# Patient Record
Sex: FEMALE | Race: WHITE | NOT HISPANIC OR LATINO | Employment: PART TIME | URBAN - METROPOLITAN AREA
[De-identification: names, ages, dates, MRNs, and addresses within clinical notes are randomized per-mention and may not be internally consistent; named-entity substitution may affect disease eponyms.]

---

## 2020-08-05 ENCOUNTER — HOSPITAL ENCOUNTER (EMERGENCY)
Age: 20
Discharge: HOME | End: 2020-08-06
Attending: EMERGENCY MEDICINE

## 2020-08-05 VITALS
TEMPERATURE: 97.2 F | BODY MASS INDEX: 23.77 KG/M2 | SYSTOLIC BLOOD PRESSURE: 124 MMHG | DIASTOLIC BLOOD PRESSURE: 76 MMHG | HEIGHT: 67 IN | WEIGHT: 151.46 LBS | OXYGEN SATURATION: 100 % | RESPIRATION RATE: 16 BRPM | HEART RATE: 76 BPM

## 2020-08-05 DIAGNOSIS — S61.210A LACERATION OF RIGHT INDEX FINGER WITHOUT FOREIGN BODY WITHOUT DAMAGE TO NAIL, INITIAL ENCOUNTER: Primary | ICD-10-CM

## 2020-08-05 DIAGNOSIS — Z23 NEED FOR TETANUS BOOSTER: ICD-10-CM

## 2020-08-05 PROCEDURE — 10002800 HB RX 250 W HCPCS: Performed by: EMERGENCY MEDICINE

## 2020-08-05 PROCEDURE — 12001 RPR S/N/AX/GEN/TRNK 2.5CM/<: CPT | Performed by: EMERGENCY MEDICINE

## 2020-08-05 PROCEDURE — 99282 EMERGENCY DEPT VISIT SF MDM: CPT

## 2020-08-05 PROCEDURE — 90471 IMMUNIZATION ADMIN: CPT | Performed by: EMERGENCY MEDICINE

## 2020-08-05 PROCEDURE — 90715 TDAP VACCINE 7 YRS/> IM: CPT | Performed by: EMERGENCY MEDICINE

## 2020-08-05 RX ADMIN — TETANUS TOXOID, REDUCED DIPHTHERIA TOXOID AND ACELLULAR PERTUSSIS VACCINE, ADSORBED 0.5 ML: 5; 2.5; 8; 8; 2.5 SUSPENSION INTRAMUSCULAR at 23:40

## 2020-08-05 ASSESSMENT — ENCOUNTER SYMPTOMS
CONSTITUTIONAL NEGATIVE: 1
RESPIRATORY NEGATIVE: 1
HEMATOLOGIC/LYMPHATIC NEGATIVE: 1
NEUROLOGICAL NEGATIVE: 1
ALLERGIC/IMMUNOLOGIC NEGATIVE: 1

## 2020-08-13 ENCOUNTER — IMAGING SERVICES (OUTPATIENT)
Dept: GENERAL RADIOLOGY | Age: 20
End: 2020-08-13
Attending: NURSE PRACTITIONER

## 2020-08-13 DIAGNOSIS — M79.5 FOREIGN BODY (FB) IN SOFT TISSUE: ICD-10-CM

## 2020-09-28 ENCOUNTER — HOSPITAL ENCOUNTER (EMERGENCY)
Age: 20
Discharge: HOME | End: 2020-09-28
Attending: EMERGENCY MEDICINE

## 2020-09-28 VITALS
HEART RATE: 88 BPM | WEIGHT: 135 LBS | DIASTOLIC BLOOD PRESSURE: 64 MMHG | HEIGHT: 65 IN | TEMPERATURE: 97.9 F | SYSTOLIC BLOOD PRESSURE: 130 MMHG | OXYGEN SATURATION: 98 % | RESPIRATION RATE: 15 BRPM | BODY MASS INDEX: 22.49 KG/M2

## 2020-09-28 DIAGNOSIS — H60.503 ACUTE OTITIS EXTERNA OF BOTH EARS, UNSPECIFIED TYPE: Primary | ICD-10-CM

## 2020-09-28 DIAGNOSIS — S09.90XA CLOSED HEAD INJURY WITHOUT LOSS OF CONSCIOUSNESS, INITIAL ENCOUNTER: ICD-10-CM

## 2020-09-28 DIAGNOSIS — S80.01XA CONTUSION OF RIGHT KNEE, INITIAL ENCOUNTER: ICD-10-CM

## 2020-09-28 PROCEDURE — 99283 EMERGENCY DEPT VISIT LOW MDM: CPT

## 2020-09-28 PROCEDURE — 10002803 HB RX 637: Performed by: PHYSICIAN ASSISTANT

## 2020-09-28 RX ORDER — OFLOXACIN 3 MG/ML
5 SOLUTION AURICULAR (OTIC) 2 TIMES DAILY
Qty: 3.5 ML | Refills: 0 | Status: SHIPPED | OUTPATIENT
Start: 2020-09-28 | End: 2020-10-05

## 2020-09-28 RX ORDER — IBUPROFEN 600 MG/1
600 TABLET ORAL ONCE
Status: COMPLETED | OUTPATIENT
Start: 2020-09-28 | End: 2020-09-28

## 2020-09-28 RX ADMIN — IBUPROFEN 600 MG: 600 TABLET ORAL at 18:43

## 2020-09-28 ASSESSMENT — ENCOUNTER SYMPTOMS
NAUSEA: 0
HEADACHES: 1
NUMBNESS: 0
CHILLS: 0
SHORTNESS OF BREATH: 0
FEVER: 0
DIZZINESS: 0
VOMITING: 0
COUGH: 0

## 2020-09-28 ASSESSMENT — PAIN SCALES - GENERAL: PAINLEVEL_OUTOF10: 4

## 2020-10-21 ENCOUNTER — TELEPHONE (OUTPATIENT)
Dept: SCHEDULING | Age: 20
End: 2020-10-21

## 2020-10-27 ENCOUNTER — OFFICE VISIT (OUTPATIENT)
Dept: INTERNAL MEDICINE | Age: 20
End: 2020-10-27

## 2020-10-27 VITALS
HEART RATE: 75 BPM | TEMPERATURE: 97.6 F | OXYGEN SATURATION: 100 % | WEIGHT: 153.2 LBS | SYSTOLIC BLOOD PRESSURE: 121 MMHG | DIASTOLIC BLOOD PRESSURE: 75 MMHG

## 2020-10-27 DIAGNOSIS — S09.90XA CLOSED HEAD INJURY WITHOUT LOSS OF CONSCIOUSNESS, INITIAL ENCOUNTER: Primary | ICD-10-CM

## 2020-10-27 DIAGNOSIS — M25.561 ACUTE PAIN OF RIGHT KNEE: ICD-10-CM

## 2020-10-27 PROCEDURE — 99204 OFFICE O/P NEW MOD 45 MIN: CPT | Performed by: INTERNAL MEDICINE

## 2020-10-27 RX ORDER — DICLOFENAC SODIUM 75 MG/1
TABLET, DELAYED RELEASE ORAL
COMMUNITY
Start: 2020-10-07 | End: 2020-10-27 | Stop reason: SDUPTHER

## 2020-10-27 RX ORDER — DICLOFENAC SODIUM 75 MG/1
75 TABLET, DELAYED RELEASE ORAL 2 TIMES DAILY
Qty: 60 TABLET | Refills: 1 | Status: SHIPPED | OUTPATIENT
Start: 2020-10-27 | End: 2023-02-03 | Stop reason: CLARIF

## 2020-10-27 ASSESSMENT — ENCOUNTER SYMPTOMS
HEADACHES: 1
DIZZINESS: 0

## 2021-10-16 ENCOUNTER — HOSPITAL ENCOUNTER (EMERGENCY)
Age: 21
Discharge: HOME | End: 2021-10-16
Attending: EMERGENCY MEDICINE

## 2021-10-16 VITALS
OXYGEN SATURATION: 100 % | TEMPERATURE: 98.1 F | SYSTOLIC BLOOD PRESSURE: 133 MMHG | HEART RATE: 77 BPM | HEIGHT: 66 IN | DIASTOLIC BLOOD PRESSURE: 78 MMHG | RESPIRATION RATE: 16 BRPM

## 2021-10-16 DIAGNOSIS — T23.222A PARTIAL THICKNESS BURN OF SINGLE FINGER OF LEFT HAND EXCLUDING THUMB, INITIAL ENCOUNTER: Primary | ICD-10-CM

## 2021-10-16 PROCEDURE — 16000 INITIAL TREATMENT OF BURN(S): CPT

## 2021-10-16 PROCEDURE — 16020 DRESS/DEBRID P-THICK BURN S: CPT

## 2021-10-16 PROCEDURE — 99283 EMERGENCY DEPT VISIT LOW MDM: CPT

## 2021-10-16 PROCEDURE — 10002803 HB RX 637: Performed by: EMERGENCY MEDICINE

## 2021-10-16 RX ORDER — IBUPROFEN 600 MG/1
600 TABLET ORAL ONCE
Status: COMPLETED | OUTPATIENT
Start: 2021-10-16 | End: 2021-10-16

## 2021-10-16 RX ADMIN — IBUPROFEN 600 MG: 600 TABLET ORAL at 21:33

## 2021-10-16 ASSESSMENT — PAIN SCALES - GENERAL: PAINLEVEL_OUTOF10: 4

## 2021-10-16 ASSESSMENT — ENCOUNTER SYMPTOMS
GASTROINTESTINAL NEGATIVE: 1
NEUROLOGICAL NEGATIVE: 1
RESPIRATORY NEGATIVE: 1
CHILLS: 0
FEVER: 0

## 2022-08-04 ENCOUNTER — APPOINTMENT (OUTPATIENT)
Dept: GENERAL RADIOLOGY | Age: 22
End: 2022-08-04
Attending: PHYSICIAN ASSISTANT

## 2022-08-04 ENCOUNTER — HOSPITAL ENCOUNTER (EMERGENCY)
Age: 22
Discharge: HOME | End: 2022-08-04
Attending: EMERGENCY MEDICINE

## 2022-08-04 ENCOUNTER — APPOINTMENT (OUTPATIENT)
Dept: CT IMAGING | Age: 22
End: 2022-08-04
Attending: PHYSICIAN ASSISTANT

## 2022-08-04 VITALS
RESPIRATION RATE: 16 BRPM | HEIGHT: 66 IN | HEART RATE: 78 BPM | TEMPERATURE: 97.7 F | DIASTOLIC BLOOD PRESSURE: 80 MMHG | SYSTOLIC BLOOD PRESSURE: 120 MMHG | OXYGEN SATURATION: 99 %

## 2022-08-04 DIAGNOSIS — R10.30 LOWER ABDOMINAL PAIN: Primary | ICD-10-CM

## 2022-08-04 LAB
ALBUMIN SERPL-MCNC: 3.8 G/DL (ref 3.6–5.1)
ALP SERPL-CCNC: 61 UNITS/L (ref 45–117)
ALT SERPL-CCNC: 21 UNITS/L
ANION GAP SERPL CALC-SCNC: 6 MMOL/L (ref 7–19)
APPEARANCE UR: CLEAR
AST SERPL-CCNC: 18 UNITS/L
BASOPHILS # BLD: 0 K/MCL (ref 0–0.3)
BASOPHILS NFR BLD: 0 %
BILIRUB CONJ SERPL-MCNC: 0.1 MG/DL (ref 0–0.2)
BILIRUB SERPL-MCNC: 0.5 MG/DL (ref 0.2–1)
BILIRUB UR QL STRIP: NEGATIVE
BUN SERPL-MCNC: 5 MG/DL (ref 6–20)
BUN/CREAT SERPL: 8 (ref 7–25)
CALCIUM SERPL-MCNC: 9.1 MG/DL (ref 8.4–10.2)
CHLORIDE SERPL-SCNC: 107 MMOL/L (ref 97–110)
CLUE CELLS VAG QL WET PREP: NORMAL
CO2 SERPL-SCNC: 30 MMOL/L (ref 21–32)
COLOR UR: YELLOW
CREAT SERPL-MCNC: 0.66 MG/DL (ref 0.51–0.95)
DEPRECATED RDW RBC: 48.8 FL (ref 39–50)
EOSINOPHIL # BLD: 0 K/MCL (ref 0–0.5)
EOSINOPHIL NFR BLD: 1 %
ERYTHROCYTE [DISTWIDTH] IN BLOOD: 14.4 % (ref 11–15)
FASTING DURATION TIME PATIENT: ABNORMAL H
GFR SERPLBLD BASED ON 1.73 SQ M-ARVRAT: >90 ML/MIN
GLUCOSE SERPL-MCNC: 94 MG/DL (ref 70–99)
GLUCOSE UR STRIP-MCNC: NEGATIVE MG/DL
HCG SERPL QL: NEGATIVE
HCG UR QL: NEGATIVE
HCT VFR BLD CALC: 39.5 % (ref 36–46.5)
HGB BLD-MCNC: 12.4 G/DL (ref 12–15.5)
HGB UR QL STRIP: NEGATIVE
IMM GRANULOCYTES # BLD AUTO: 0 K/MCL (ref 0–0.2)
IMM GRANULOCYTES # BLD: 0 %
KETONES UR STRIP-MCNC: NEGATIVE MG/DL
LEUKOCYTE ESTERASE UR QL STRIP: NEGATIVE
LIPASE SERPL-CCNC: 137 UNITS/L (ref 73–393)
LYMPHOCYTES # BLD: 2.4 K/MCL (ref 1–4.8)
LYMPHOCYTES NFR BLD: 40 %
MCH RBC QN AUTO: 28.6 PG (ref 26–34)
MCHC RBC AUTO-ENTMCNC: 31.4 G/DL (ref 32–36.5)
MCV RBC AUTO: 91.2 FL (ref 78–100)
MONOCYTES # BLD: 0.6 K/MCL (ref 0.3–0.9)
MONOCYTES NFR BLD: 9 %
NEUTROPHILS # BLD: 3 K/MCL (ref 1.8–7.7)
NEUTROPHILS NFR BLD: 50 %
NITRITE UR QL STRIP: NEGATIVE
NRBC BLD MANUAL-RTO: 0 /100 WBC
PH UR STRIP: 8 [PH] (ref 5–7)
PLATELET # BLD AUTO: 378 K/MCL (ref 140–450)
POTASSIUM SERPL-SCNC: 4 MMOL/L (ref 3.4–5.1)
PROT SERPL-MCNC: 7.8 G/DL (ref 6.4–8.2)
PROT UR STRIP-MCNC: NEGATIVE MG/DL
RAINBOW EXTRA TUBES HOLD SPECIMEN: NORMAL
RBC # BLD: 4.33 MIL/MCL (ref 4–5.2)
SODIUM SERPL-SCNC: 139 MMOL/L (ref 135–145)
SP GR UR STRIP: 1.01 (ref 1–1.03)
T VAGINALIS VAG QL WET PREP: NORMAL
UROBILINOGEN UR STRIP-MCNC: 0.2 MG/DL
WBC # BLD: 5.9 K/MCL (ref 4.2–11)
YEAST VAG QL WET PREP: NORMAL

## 2022-08-04 PROCEDURE — 80048 BASIC METABOLIC PNL TOTAL CA: CPT | Performed by: EMERGENCY MEDICINE

## 2022-08-04 PROCEDURE — 10002800 HB RX 250 W HCPCS: Performed by: PHYSICIAN ASSISTANT

## 2022-08-04 PROCEDURE — 71046 X-RAY EXAM CHEST 2 VIEWS: CPT

## 2022-08-04 PROCEDURE — 96375 TX/PRO/DX INJ NEW DRUG ADDON: CPT

## 2022-08-04 PROCEDURE — 96374 THER/PROPH/DIAG INJ IV PUSH: CPT

## 2022-08-04 PROCEDURE — 10002807 HB RX 258: Performed by: PHYSICIAN ASSISTANT

## 2022-08-04 PROCEDURE — G1004 CDSM NDSC: HCPCS

## 2022-08-04 PROCEDURE — 84703 CHORIONIC GONADOTROPIN ASSAY: CPT

## 2022-08-04 PROCEDURE — 81003 URINALYSIS AUTO W/O SCOPE: CPT | Performed by: PHYSICIAN ASSISTANT

## 2022-08-04 PROCEDURE — 10002805 HB CONTRAST AGENT: Performed by: PHYSICIAN ASSISTANT

## 2022-08-04 PROCEDURE — 87210 SMEAR WET MOUNT SALINE/INK: CPT | Performed by: PHYSICIAN ASSISTANT

## 2022-08-04 PROCEDURE — 74177 CT ABD & PELVIS W/CONTRAST: CPT

## 2022-08-04 PROCEDURE — 87591 N.GONORRHOEAE DNA AMP PROB: CPT | Performed by: PHYSICIAN ASSISTANT

## 2022-08-04 PROCEDURE — 80076 HEPATIC FUNCTION PANEL: CPT | Performed by: PHYSICIAN ASSISTANT

## 2022-08-04 PROCEDURE — 99284 EMERGENCY DEPT VISIT MOD MDM: CPT

## 2022-08-04 PROCEDURE — 13003398 CT LUMBAR SPINE 2D REFORMATTED

## 2022-08-04 PROCEDURE — 85025 COMPLETE CBC W/AUTO DIFF WBC: CPT | Performed by: EMERGENCY MEDICINE

## 2022-08-04 PROCEDURE — 96361 HYDRATE IV INFUSION ADD-ON: CPT

## 2022-08-04 PROCEDURE — 83690 ASSAY OF LIPASE: CPT | Performed by: PHYSICIAN ASSISTANT

## 2022-08-04 PROCEDURE — 84703 CHORIONIC GONADOTROPIN ASSAY: CPT | Performed by: PHYSICIAN ASSISTANT

## 2022-08-04 RX ORDER — ONDANSETRON 2 MG/ML
4 INJECTION INTRAMUSCULAR; INTRAVENOUS ONCE
Status: COMPLETED | OUTPATIENT
Start: 2022-08-04 | End: 2022-08-04

## 2022-08-04 RX ADMIN — ONDANSETRON 4 MG: 2 INJECTION INTRAMUSCULAR; INTRAVENOUS at 15:14

## 2022-08-04 RX ADMIN — IOHEXOL 65 ML: 350 INJECTION, SOLUTION INTRAVENOUS at 16:00

## 2022-08-04 RX ADMIN — KETOROLAC TROMETHAMINE 15 MG: 15 INJECTION, SOLUTION INTRAMUSCULAR; INTRAVENOUS at 15:14

## 2022-08-04 RX ADMIN — SODIUM CHLORIDE 1000 ML: 9 INJECTION, SOLUTION INTRAVENOUS at 15:16

## 2022-08-04 ASSESSMENT — PAIN SCALES - GENERAL: PAINLEVEL_OUTOF10: 6

## 2022-08-05 LAB
C TRACH RRNA CVX QL NAA+PROBE: NEGATIVE
Lab: NORMAL
N GONORRHOEA RRNA CVX QL NAA+PROBE: NEGATIVE

## 2023-02-03 ENCOUNTER — OFFICE VISIT (OUTPATIENT)
Dept: OBGYN | Age: 23
End: 2023-02-03

## 2023-02-03 VITALS
TEMPERATURE: 97.9 F | DIASTOLIC BLOOD PRESSURE: 76 MMHG | SYSTOLIC BLOOD PRESSURE: 119 MMHG | WEIGHT: 165 LBS | HEIGHT: 66 IN | BODY MASS INDEX: 26.52 KG/M2 | HEART RATE: 52 BPM | OXYGEN SATURATION: 100 %

## 2023-02-03 DIAGNOSIS — N92.6 IRREGULAR MENSTRUAL CYCLE: Primary | ICD-10-CM

## 2023-02-03 DIAGNOSIS — N94.6 DYSMENORRHEA: ICD-10-CM

## 2023-02-03 DIAGNOSIS — N75.0 CYST OF BARTHOLIN'S GLAND DUCT: ICD-10-CM

## 2023-02-03 DIAGNOSIS — R10.2 PELVIC PAIN IN FEMALE: ICD-10-CM

## 2023-02-03 DIAGNOSIS — N93.0 BLEEDING AFTER INTERCOURSE: ICD-10-CM

## 2023-02-03 DIAGNOSIS — Z11.3 SCREEN FOR STD (SEXUALLY TRANSMITTED DISEASE): ICD-10-CM

## 2023-02-03 DIAGNOSIS — Z97.5 IUD CONTRACEPTION: ICD-10-CM

## 2023-02-03 LAB
B-HCG UR QL: NEGATIVE
INTERNAL PROCEDURAL CONTROLS ACCEPTABLE: YES
TEST LOT EXPIRATION DATE: NORMAL
TEST LOT NUMBER: NORMAL

## 2023-02-03 PROCEDURE — 99204 OFFICE O/P NEW MOD 45 MIN: CPT | Performed by: OBSTETRICS & GYNECOLOGY

## 2023-02-03 PROCEDURE — 87661 TRICHOMONAS VAGINALIS AMPLIF: CPT | Performed by: INTERNAL MEDICINE

## 2023-02-03 PROCEDURE — 87591 N.GONORRHOEAE DNA AMP PROB: CPT | Performed by: INTERNAL MEDICINE

## 2023-02-03 PROCEDURE — 87491 CHLMYD TRACH DNA AMP PROBE: CPT | Performed by: INTERNAL MEDICINE

## 2023-02-03 PROCEDURE — 81025 URINE PREGNANCY TEST: CPT | Performed by: OBSTETRICS & GYNECOLOGY

## 2023-02-03 PROCEDURE — 88175 CYTOPATH C/V AUTO FLUID REDO: CPT | Performed by: INTERNAL MEDICINE

## 2023-02-03 ASSESSMENT — PATIENT HEALTH QUESTIONNAIRE - PHQ9
CLINICAL INTERPRETATION OF PHQ2 SCORE: NO FURTHER SCREENING NEEDED
SUM OF ALL RESPONSES TO PHQ9 QUESTIONS 1 AND 2: 0
SUM OF ALL RESPONSES TO PHQ9 QUESTIONS 1 AND 2: 0
1. LITTLE INTEREST OR PLEASURE IN DOING THINGS: NOT AT ALL
2. FEELING DOWN, DEPRESSED OR HOPELESS: NOT AT ALL

## 2023-02-06 LAB
C TRACH RRNA CVX QL NAA+PROBE: NEGATIVE
Lab: NORMAL
N GONORRHOEA RRNA CVX QL NAA+PROBE: NEGATIVE
T VAGINALIS RRNA CVX QL NAA+PROBE: NEGATIVE

## 2023-02-07 LAB — HOLD SPECIMEN: NORMAL

## 2023-02-08 LAB
CASE RPRT: NORMAL
CLINICAL INFO: NORMAL
CYTOLOGY CVX/VAG STUDY: NORMAL
PAP EDUCATIONAL NOTE: NORMAL
SPECIMEN ADEQUACY: NORMAL

## 2023-02-23 ENCOUNTER — HOSPITAL ENCOUNTER (OUTPATIENT)
Dept: ULTRASOUND IMAGING | Age: 23
Discharge: HOME | End: 2023-02-23
Attending: OBSTETRICS & GYNECOLOGY

## 2023-02-23 DIAGNOSIS — R10.2 PELVIC PAIN IN FEMALE: ICD-10-CM

## 2023-02-23 PROCEDURE — 76830 TRANSVAGINAL US NON-OB: CPT

## 2023-03-06 ENCOUNTER — HOSPITAL ENCOUNTER (EMERGENCY)
Facility: HOSPITAL | Age: 23
Discharge: HOME | End: 2023-03-06

## 2023-03-06 ENCOUNTER — APPOINTMENT (OUTPATIENT)
Dept: CT IMAGING | Facility: HOSPITAL | Age: 23
End: 2023-03-06
Attending: NURSE PRACTITIONER

## 2023-03-06 VITALS
OXYGEN SATURATION: 100 % | RESPIRATION RATE: 18 BRPM | SYSTOLIC BLOOD PRESSURE: 116 MMHG | WEIGHT: 165 LBS | DIASTOLIC BLOOD PRESSURE: 73 MMHG | TEMPERATURE: 98 F | HEART RATE: 78 BPM | HEIGHT: 66 IN | BODY MASS INDEX: 26.52 KG/M2

## 2023-03-06 DIAGNOSIS — R10.9 ABDOMINAL PAIN, ACUTE: Primary | ICD-10-CM

## 2023-03-06 LAB
ALBUMIN SERPL-MCNC: 4.2 G/DL (ref 3.4–5)
ALBUMIN/GLOB SERPL: 1.1 {RATIO} (ref 1–2)
ALP LIVER SERPL-CCNC: 58 U/L
ALT SERPL-CCNC: 17 U/L
ANION GAP SERPL CALC-SCNC: 4 MMOL/L (ref 0–18)
AST SERPL-CCNC: 22 U/L (ref 15–37)
B-HCG UR QL: NEGATIVE
BASOPHILS # BLD AUTO: 0.01 X10(3) UL (ref 0–0.2)
BASOPHILS NFR BLD AUTO: 0.1 %
BILIRUB SERPL-MCNC: 1.2 MG/DL (ref 0.1–2)
BILIRUB UR QL: NEGATIVE
BUN BLD-MCNC: 7 MG/DL (ref 7–18)
BUN/CREAT SERPL: 7.8 (ref 10–20)
CALCIUM BLD-MCNC: 9.1 MG/DL (ref 8.5–10.1)
CHLORIDE SERPL-SCNC: 107 MMOL/L (ref 98–112)
CLARITY UR: CLEAR
CO2 SERPL-SCNC: 26 MMOL/L (ref 21–32)
COLOR UR: YELLOW
CREAT BLD-MCNC: 0.9 MG/DL
DEPRECATED RDW RBC AUTO: 42.6 FL (ref 35.1–46.3)
EOSINOPHIL # BLD AUTO: 0.01 X10(3) UL (ref 0–0.7)
EOSINOPHIL NFR BLD AUTO: 0.1 %
ERYTHROCYTE [DISTWIDTH] IN BLOOD BY AUTOMATED COUNT: 13.1 % (ref 11–15)
GFR SERPLBLD BASED ON 1.73 SQ M-ARVRAT: 93 ML/MIN/1.73M2 (ref 60–?)
GLOBULIN PLAS-MCNC: 3.7 G/DL (ref 2.8–4.4)
GLUCOSE BLD-MCNC: 90 MG/DL (ref 70–99)
GLUCOSE UR-MCNC: NEGATIVE MG/DL
HCT VFR BLD AUTO: 38.2 %
HGB BLD-MCNC: 12.9 G/DL
HGB UR QL STRIP.AUTO: NEGATIVE
IMM GRANULOCYTES # BLD AUTO: 0.01 X10(3) UL (ref 0–1)
IMM GRANULOCYTES NFR BLD: 0.1 %
LEUKOCYTE ESTERASE UR QL STRIP.AUTO: NEGATIVE
LIPASE SERPL-CCNC: 102 U/L (ref 73–393)
LIPASE SERPL-CCNC: 25 U/L (ref 13–75)
LYMPHOCYTES # BLD AUTO: 1.97 X10(3) UL (ref 1–4)
LYMPHOCYTES NFR BLD AUTO: 23.6 %
MCH RBC QN AUTO: 30 PG (ref 26–34)
MCHC RBC AUTO-ENTMCNC: 33.8 G/DL (ref 31–37)
MCV RBC AUTO: 88.8 FL
MONOCYTES # BLD AUTO: 0.65 X10(3) UL (ref 0.1–1)
MONOCYTES NFR BLD AUTO: 7.8 %
NEUTROPHILS # BLD AUTO: 5.7 X10 (3) UL (ref 1.5–7.7)
NEUTROPHILS # BLD AUTO: 5.7 X10(3) UL (ref 1.5–7.7)
NEUTROPHILS NFR BLD AUTO: 68.3 %
NITRITE UR QL STRIP.AUTO: NEGATIVE
OSMOLALITY SERPL CALC.SUM OF ELEC: 282 MOSM/KG (ref 275–295)
PH UR: 6 [PH] (ref 5–8)
PLATELET # BLD AUTO: 378 10(3)UL (ref 150–450)
POTASSIUM SERPL-SCNC: 3.8 MMOL/L (ref 3.5–5.1)
PROT SERPL-MCNC: 7.9 G/DL (ref 6.4–8.2)
PROT UR-MCNC: NEGATIVE MG/DL
RBC # BLD AUTO: 4.3 X10(6)UL
SODIUM SERPL-SCNC: 137 MMOL/L (ref 136–145)
SP GR UR STRIP: 1.02 (ref 1–1.03)
UROBILINOGEN UR STRIP-ACNC: <2
VIT C UR-MCNC: NEGATIVE MG/DL
WBC # BLD AUTO: 8.4 X10(3) UL (ref 4–11)

## 2023-03-06 PROCEDURE — 96360 HYDRATION IV INFUSION INIT: CPT

## 2023-03-06 PROCEDURE — 80053 COMPREHEN METABOLIC PANEL: CPT

## 2023-03-06 PROCEDURE — 83690 ASSAY OF LIPASE: CPT | Performed by: NURSE PRACTITIONER

## 2023-03-06 PROCEDURE — 99284 EMERGENCY DEPT VISIT MOD MDM: CPT

## 2023-03-06 PROCEDURE — 81003 URINALYSIS AUTO W/O SCOPE: CPT

## 2023-03-06 PROCEDURE — 74177 CT ABD & PELVIS W/CONTRAST: CPT | Performed by: NURSE PRACTITIONER

## 2023-03-06 PROCEDURE — 81025 URINE PREGNANCY TEST: CPT

## 2023-03-06 PROCEDURE — 85025 COMPLETE CBC W/AUTO DIFF WBC: CPT

## 2023-03-06 RX ORDER — DICYCLOMINE HCL 20 MG
20 TABLET ORAL 4 TIMES DAILY PRN
Qty: 30 TABLET | Refills: 0 | Status: SHIPPED | OUTPATIENT
Start: 2023-03-06 | End: 2023-04-05

## 2023-03-06 NOTE — ED INITIAL ASSESSMENT (HPI)
Pt to ED with c/o left upper abd pain lower pelvic pain x 2 months and epigastric pain \"it's shay always there but it intensified today\". Pt seen a GI specialist who scheduled her for colonoscopy/endoscopy but the appt is not until May.

## 2024-01-19 ENCOUNTER — HOSPITAL ENCOUNTER (EMERGENCY)
Facility: HOSPITAL | Age: 24
Discharge: HOME | End: 2024-01-19
Attending: EMERGENCY MEDICINE
Payer: COMMERCIAL

## 2024-01-19 ENCOUNTER — APPOINTMENT (OUTPATIENT)
Dept: CT IMAGING | Facility: HOSPITAL | Age: 24
End: 2024-01-19
Attending: EMERGENCY MEDICINE
Payer: COMMERCIAL

## 2024-01-19 VITALS
RESPIRATION RATE: 18 BRPM | HEART RATE: 69 BPM | WEIGHT: 180 LBS | OXYGEN SATURATION: 100 % | BODY MASS INDEX: 29 KG/M2 | SYSTOLIC BLOOD PRESSURE: 127 MMHG | TEMPERATURE: 97 F | DIASTOLIC BLOOD PRESSURE: 69 MMHG

## 2024-01-19 DIAGNOSIS — S16.1XXA STRAIN OF NECK MUSCLE, INITIAL ENCOUNTER: Primary | ICD-10-CM

## 2024-01-19 LAB — B-HCG UR QL: NEGATIVE

## 2024-01-19 PROCEDURE — 99284 EMERGENCY DEPT VISIT MOD MDM: CPT

## 2024-01-19 PROCEDURE — 81025 URINE PREGNANCY TEST: CPT

## 2024-01-19 PROCEDURE — 72125 CT NECK SPINE W/O DYE: CPT | Performed by: EMERGENCY MEDICINE

## 2024-01-19 PROCEDURE — 96372 THER/PROPH/DIAG INJ SC/IM: CPT

## 2024-01-19 RX ORDER — DIAZEPAM 5 MG/1
5 TABLET ORAL ONCE
Status: COMPLETED | OUTPATIENT
Start: 2024-01-19 | End: 2024-01-19

## 2024-01-19 RX ORDER — KETOROLAC TROMETHAMINE 10 MG/1
10 TABLET, FILM COATED ORAL EVERY 6 HOURS PRN
Qty: 12 TABLET | Refills: 0 | Status: SHIPPED | OUTPATIENT
Start: 2024-01-19 | End: 2024-01-26

## 2024-01-19 RX ORDER — KETOROLAC TROMETHAMINE 30 MG/ML
30 INJECTION, SOLUTION INTRAMUSCULAR; INTRAVENOUS ONCE
Status: COMPLETED | OUTPATIENT
Start: 2024-01-19 | End: 2024-01-19

## 2024-01-19 RX ORDER — DIAZEPAM 5 MG/1
5 TABLET ORAL 3 TIMES DAILY PRN
Qty: 12 TABLET | Refills: 0 | Status: SHIPPED | OUTPATIENT
Start: 2024-01-19 | End: 2024-01-26

## 2024-01-20 NOTE — ED PROVIDER NOTES
Patient Seen in: [facility] Emergency Department    History     Chief Complaint   Patient presents with    Neck Pain       HPI    23-year-old male presents ER with complaint of cervical strain.  Patient states he has a past medical history of hyperreflexia and states that she woke up this morning and rotate her neck and felt a \"pop.  Patient states she has tightness to the right upper part of her neck.  Patient states she has nothing medication for the pain but states it hurts when she transfer left.    History reviewed.   Past Medical History:   Diagnosis Date    Autism     Fibromyalgia        History reviewed. History reviewed. No pertinent surgical history.      Medications :  (Not in a hospital admission)       History reviewed. No pertinent family history.    Smoking Status:   Social History     Socioeconomic History    Marital status:    Tobacco Use    Smoking status: Never    Smokeless tobacco: Never   Substance and Sexual Activity    Alcohol use: Never    Drug use: Never       ROS  All pertinent positives for the review of systems are mentioned in the HPI  All other organ systems are reviewed and are negative.    Constitutional and vital signs reviewed.      Social History and Family History elements reviewed from today, pertinent positives to the presenting problem noted.    Physical Exam     ED Triage Vitals [01/19/24 2009]   /80   Pulse 82   Resp 18   Temp 97.3 °F (36.3 °C)   Temp src Temporal   SpO2 100 %   O2 Device None (Room air)       All measures to prevent infection transmission during my interaction with the patient were taken. The patient was already wearing a droplet mask on my arrival to the room. Personal protective equipment including droplet mask, eye protection, and gloves were worn throughout the duration of the exam.  Handwashing was performed prior to and after the exam.  Stethoscope and any equipment used during my examination was cleaned with super sani-cloth  germicidal wipes following the exam.     Physical Exam  Vitals and nursing note reviewed.   Constitutional:       Appearance: She is well-developed.   HENT:      Head: Normocephalic and atraumatic.      Right Ear: External ear normal.      Left Ear: External ear normal.      Nose: Nose normal.   Eyes:      Conjunctiva/sclera: Conjunctivae normal.      Pupils: Pupils are equal, round, and reactive to light.   Cardiovascular:      Rate and Rhythm: Normal rate and regular rhythm.      Heart sounds: Normal heart sounds.   Pulmonary:      Effort: Pulmonary effort is normal.      Breath sounds: Normal breath sounds.   Abdominal:      General: Bowel sounds are normal.      Palpations: Abdomen is soft.   Musculoskeletal:         General: Normal range of motion.      Cervical back: Normal range of motion and neck supple.   Skin:     General: Skin is warm and dry.   Neurological:      Mental Status: She is alert and oriented to person, place, and time.      Deep Tendon Reflexes: Reflexes are normal and symmetric.   Psychiatric:         Behavior: Behavior normal.         Thought Content: Thought content normal.         Judgment: Judgment normal.         ED Course        Labs Reviewed   POCT PREGNANCY URINE - Normal         Imaging Results Available and Reviewed while in ED: CT CERVICAL SPINE    COMPARISON: None    IMPRESSION    The vertebral bodies are well aligned and there is no evidence for fracture.   No prevertebral soft tissue swelling or surrounding soft tissue edema.  Spinal canal appears widely patent.    Lung apices and superior mediastinum are unremarkable    ED Medications Administered:   Medications   ketorolac (Toradol) 30 MG/ML injection 30 mg (30 mg Intramuscular Given 1/19/24 2305)   diazePAM (Valium) tab 5 mg (5 mg Oral Given 1/19/24 2305)         MDM     Vitals:    01/19/24 2009   BP: 119/80   Pulse: 82   Resp: 18   Temp: 97.3 °F (36.3 °C)   TempSrc: Temporal   SpO2: 100%   Weight: 81.6 kg     *I personally  reviewed and interpreted all ED vitals.  I also personally reviewed all labs and imaging if ordered    Pulse Ox: 100%, Room air, Normal     Monitor Interpretation:   normal sinus rhythm    Differential Diagnosis/ Diagnostic Considerations: Cervical strain, bulging disc, transverse process LOC.    Medical Record Review: I personally reviewed available prior medical records for any recent pertinent discharge summaries, testing, and procedures and reviewed those reports.    Complicating Factors: The patient already has does not have any pertinent problems on file. to contribute to the complexity of this ED evaluation.    Medical Decision Making  23-year-old male presents ER with complaint of right-sided neck pain.  Patient CT cervical spine shows good alignment of the spine and no acute fracture.  Patient given Toradol and Valium in the ER and states this helped with her neck pain.  Patient likely has muscle strain or torticollis.  Patient instructed to take Toradol and Valium as needed for pain and apply heating pad.  Patient's  bedside made aware of the discharge planning disposition.    Problems Addressed:  Strain of neck muscle, initial encounter: acute illness or injury    Amount and/or Complexity of Data Reviewed  Independent Historian:      Details: Patient's  bedside states she has a history of hyperreflexia  Radiology: ordered and independent interpretation performed. Decision-making details documented in ED Course.     Details: CT cervical spine 0 by myself shows no acute fracture.    Risk  Prescription drug management.        Condition upon leaving the department: Stable    Disposition and Plan     Clinical Impression:  1. Strain of neck muscle, initial encounter        Disposition:  Discharge    Follow-up:  Ana Paula Gee MD  [address] [address]  [phone]    Schedule an appointment as soon as possible for a visit  If symptoms  worsen      Medications Prescribed:  Current Discharge Medication List        START taking these medications    Details   Ketorolac Tromethamine 10 MG Oral Tab Take 1 tablet (10 mg total) by mouth every 6 (six) hours as needed.  Qty: 12 tablet, Refills: 0      diazePAM 5 MG Oral Tab Take 1 tablet (5 mg total) by mouth 3 (three) times daily as needed.  Qty: 12 tablet, Refills: 0    Associated Diagnoses: Strain of neck muscle, initial encounter

## 2024-01-20 NOTE — ED INITIAL ASSESSMENT (HPI)
Patient states today she was stretching her neck when she felt a \"pop\" states she is now having pain when she is moving her head in any direction with pain radiating down her right arm.

## 2024-03-05 LAB
AMB EXT ANTIBODY SCREEN: NEGATIVE
AMB EXT HBSAG SCREEN: NONREACTIVE
AMB EXT HEMATOCRIT: 41.5
AMB EXT HEMOGLOBIN: 14.1
AMB EXT HEPATITIS C VIRUS ANTIBODY: NONREACTIVE
AMB EXT HIV AG AB COMBO: NONREACTIVE
AMB EXT MCV: 88.5
AMB EXT PLATELETS: 441
AMB EXT RH FACTOR: POSITIVE
AMB EXT TREPONEMAL ANTIBODIES: NONREACTIVE

## 2024-03-12 ENCOUNTER — HOSPITAL ENCOUNTER (EMERGENCY)
Facility: HOSPITAL | Age: 24
Discharge: HOME | End: 2024-03-12
Attending: EMERGENCY MEDICINE
Payer: COMMERCIAL

## 2024-03-12 VITALS
RESPIRATION RATE: 17 BRPM | WEIGHT: 175 LBS | SYSTOLIC BLOOD PRESSURE: 123 MMHG | DIASTOLIC BLOOD PRESSURE: 72 MMHG | BODY MASS INDEX: 28.13 KG/M2 | TEMPERATURE: 98 F | OXYGEN SATURATION: 100 % | HEART RATE: 68 BPM | HEIGHT: 66 IN

## 2024-03-12 DIAGNOSIS — O21.0 HYPEREMESIS GRAVIDARUM (HCC): Primary | ICD-10-CM

## 2024-03-12 DIAGNOSIS — E87.6 HYPOKALEMIA: ICD-10-CM

## 2024-03-12 LAB
ALBUMIN SERPL-MCNC: 4.3 G/DL (ref 3.4–5)
ALBUMIN/GLOB SERPL: 1 {RATIO} (ref 1–2)
ALP LIVER SERPL-CCNC: 57 U/L
ALT SERPL-CCNC: 25 U/L
ANION GAP SERPL CALC-SCNC: 10 MMOL/L (ref 0–18)
AST SERPL-CCNC: 14 U/L (ref 15–37)
BASOPHILS # BLD AUTO: 0.02 X10(3) UL (ref 0–0.2)
BASOPHILS NFR BLD AUTO: 0.2 %
BILIRUB SERPL-MCNC: 2.5 MG/DL (ref 0.1–2)
BUN BLD-MCNC: 6 MG/DL (ref 9–23)
CALCIUM BLD-MCNC: 9.7 MG/DL (ref 8.5–10.1)
CHLORIDE SERPL-SCNC: 100 MMOL/L (ref 98–112)
CO2 SERPL-SCNC: 21 MMOL/L (ref 21–32)
CREAT BLD-MCNC: 0.78 MG/DL
EGFRCR SERPLBLD CKD-EPI 2021: 109 ML/MIN/1.73M2 (ref 60–?)
EOSINOPHIL # BLD AUTO: 0 X10(3) UL (ref 0–0.7)
EOSINOPHIL NFR BLD AUTO: 0 %
ERYTHROCYTE [DISTWIDTH] IN BLOOD BY AUTOMATED COUNT: 12.3 %
GLOBULIN PLAS-MCNC: 4.2 G/DL (ref 2.8–4.4)
GLUCOSE BLD-MCNC: 87 MG/DL (ref 70–99)
HCT VFR BLD AUTO: 41.6 %
HGB BLD-MCNC: 15.2 G/DL
IMM GRANULOCYTES # BLD AUTO: 0.03 X10(3) UL (ref 0–1)
IMM GRANULOCYTES NFR BLD: 0.3 %
LYMPHOCYTES # BLD AUTO: 1.27 X10(3) UL (ref 1–4)
LYMPHOCYTES NFR BLD AUTO: 13.8 %
MAGNESIUM SERPL-MCNC: 1.7 MG/DL (ref 1.6–2.6)
MCH RBC QN AUTO: 30.5 PG (ref 26–34)
MCHC RBC AUTO-ENTMCNC: 36.5 G/DL (ref 31–37)
MCV RBC AUTO: 83.4 FL
MONOCYTES # BLD AUTO: 1.03 X10(3) UL (ref 0.1–1)
MONOCYTES NFR BLD AUTO: 11.2 %
NEUTROPHILS # BLD AUTO: 6.86 X10 (3) UL (ref 1.5–7.7)
NEUTROPHILS # BLD AUTO: 6.86 X10(3) UL (ref 1.5–7.7)
NEUTROPHILS NFR BLD AUTO: 74.5 %
OSMOLALITY SERPL CALC.SUM OF ELEC: 269 MOSM/KG (ref 275–295)
PLATELET # BLD AUTO: 371 10(3)UL (ref 150–450)
POTASSIUM SERPL-SCNC: 3.1 MMOL/L (ref 3.5–5.1)
PROT SERPL-MCNC: 8.5 G/DL (ref 6.4–8.2)
RBC # BLD AUTO: 4.99 X10(6)UL
SODIUM SERPL-SCNC: 131 MMOL/L (ref 136–145)
WBC # BLD AUTO: 9.2 X10(3) UL (ref 4–11)

## 2024-03-12 PROCEDURE — 83735 ASSAY OF MAGNESIUM: CPT | Performed by: EMERGENCY MEDICINE

## 2024-03-12 PROCEDURE — 96375 TX/PRO/DX INJ NEW DRUG ADDON: CPT

## 2024-03-12 PROCEDURE — S0028 INJECTION, FAMOTIDINE, 20 MG: HCPCS | Performed by: EMERGENCY MEDICINE

## 2024-03-12 PROCEDURE — 96374 THER/PROPH/DIAG INJ IV PUSH: CPT

## 2024-03-12 PROCEDURE — 99285 EMERGENCY DEPT VISIT HI MDM: CPT

## 2024-03-12 PROCEDURE — 85025 COMPLETE CBC W/AUTO DIFF WBC: CPT | Performed by: EMERGENCY MEDICINE

## 2024-03-12 PROCEDURE — 96361 HYDRATE IV INFUSION ADD-ON: CPT

## 2024-03-12 PROCEDURE — 80053 COMPREHEN METABOLIC PANEL: CPT | Performed by: EMERGENCY MEDICINE

## 2024-03-12 RX ORDER — METOCLOPRAMIDE 5 MG/1
5 TABLET ORAL
Qty: 60 TABLET | Refills: 0 | Status: SHIPPED | OUTPATIENT
Start: 2024-03-12 | End: 2024-03-27

## 2024-03-12 RX ORDER — FAMOTIDINE 10 MG/ML
20 INJECTION, SOLUTION INTRAVENOUS ONCE
Status: COMPLETED | OUTPATIENT
Start: 2024-03-12 | End: 2024-03-12

## 2024-03-12 RX ORDER — POTASSIUM CHLORIDE 20 MEQ/1
40 TABLET, EXTENDED RELEASE ORAL ONCE
Status: COMPLETED | OUTPATIENT
Start: 2024-03-12 | End: 2024-03-12

## 2024-03-12 RX ORDER — METOCLOPRAMIDE HYDROCHLORIDE 5 MG/ML
10 INJECTION INTRAMUSCULAR; INTRAVENOUS ONCE
Status: COMPLETED | OUTPATIENT
Start: 2024-03-12 | End: 2024-03-12

## 2024-03-12 RX ORDER — ONDANSETRON 2 MG/ML
4 INJECTION INTRAMUSCULAR; INTRAVENOUS ONCE
Status: COMPLETED | OUTPATIENT
Start: 2024-03-12 | End: 2024-03-12

## 2024-03-12 RX ORDER — DIPHENHYDRAMINE HYDROCHLORIDE 50 MG/ML
50 INJECTION INTRAMUSCULAR; INTRAVENOUS ONCE
Status: COMPLETED | OUTPATIENT
Start: 2024-03-12 | End: 2024-03-12

## 2024-03-12 RX ORDER — DIPHENHYDRAMINE HCL 25 MG
25 CAPSULE ORAL EVERY 6 HOURS PRN
Qty: 60 CAPSULE | Refills: 0 | Status: SHIPPED | OUTPATIENT
Start: 2024-03-12

## 2024-03-12 NOTE — ED INITIAL ASSESSMENT (HPI)
Patient presents to ED with N/V for the past week that has worsened since she was discharged from [facility] one week ago. Patient unable to keep ODT zofran down, water, or food.

## 2024-03-12 NOTE — ED PROVIDER NOTES
Patient Seen in: [facility] Emergency Department      History     Chief Complaint   Patient presents with    Hyperemesis Gravidarum     Stated Complaint: 7wks, vomiting zofran no relief    Subjective:   HPI    Vomiting for multiple weeks multiple times a day and unable to hold down food- OB at [place]       Objective:   Past Medical History:   Diagnosis Date    Autism (HCC)     Fibromyalgia     Fibromyalgia               History reviewed. No pertinent surgical history.             Social History     Socioeconomic History    Marital status:    Tobacco Use    Smoking status: Never    Smokeless tobacco: Never   Vaping Use    Vaping Use: Never used   Substance and Sexual Activity    Alcohol use: Never    Drug use: Never              Review of Systems    Positive for stated complaint: 7wks, vomiting zofran no relief  Other systems are as noted in HPI.  Constitutional and vital signs reviewed.      All other systems reviewed and negative except as noted above.    Physical Exam     ED Triage Vitals [03/12/24 1608]   /83   Pulse 112   Resp 22   Temp 98.2 °F (36.8 °C)   Temp src Oral   SpO2 94 %   O2 Device None (Room air)       Current:/71   Pulse 77   Temp 98.2 °F (36.8 °C) (Oral)   Resp 18   Ht 167.6 cm (5' 6\")   Wt 79.4 kg   LMP 12/22/2023   SpO2 100%   BMI 28.25 kg/m²         Physical Exam    ***      ED Course     Labs Reviewed   COMP METABOLIC PANEL (14) - Abnormal; Notable for the following components:       Result Value    Sodium 131 (*)     Potassium 3.1 (*)     BUN 6 (*)     Calculated Osmolality 269 (*)     AST 14 (*)     Bilirubin, Total 2.5 (*)     Total Protein 8.5 (*)     All other components within normal limits   CBC W/ DIFFERENTIAL - Abnormal; Notable for the following components:    Monocyte Absolute 1.03 (*)     All other components within normal limits   CBC WITH DIFFERENTIAL WITH PLATELET    Narrative:     The following orders were created for panel order CBC With  Differential With Platelet.  Procedure                               Abnormality         Status                     ---------                               -----------         ------                     CBC W/ DIFFERENTIAL[[id number]]          Abnormal            Final result                 Please view results for these tests on the individual orders.   URINALYSIS, ROUTINE             ***         MDM      ***                                   MDM    Disposition and Plan     Clinical Impression:  No diagnosis found.     Disposition:  There is no disposition on file for this visit.  There is no disposition time on file for this visit.    Follow-up:  No follow-up provider specified.        Medications Prescribed:  There are no discharge medications for this patient.                                      or hepatitis but after Zofran and Benadryl and Reglan the patient does not have any significant abdominal pain.  She is sleeping in the emergency department  states she really has not been sleeping at home secondary to the severe nausea and vomiting so we let her do so.  A second liter of fluids was given.  After the Benadryl patient was actually pretty difficult to wake up to take her potassium so we waited until we could reliably wake her up and keep awake to take her meds.  Upon waking up her heart rate is about half what it was when she got here she feels significantly better she has had 2 full liters of IV fluids aggressive nausea control antacids and potassium replacement.  I discussed with the patient and her  that she needs to continue small sips and small bites of food and fluids as often as humanly possible and that we would try some more nausea medicine for home.  Patient needs to call her ED at [place] to follow-up for ongoing care.  They expressed understanding she is much improved and now stable for discharge                                   Medical Decision Making      Disposition and Plan     Clinical Impression:  1. Hyperemesis gravidarum (HCC)    2. Hypokalemia         Disposition:  Discharge  3/12/2024  9:57 pm    Follow-up:  [facility] Emergency Department  [address] [place] [address]  [phone]  Follow up  If symptoms worsen          Medications Prescribed:  Discharge Medication List as of 3/12/2024 10:00 PM        START taking these medications    Details   diphenhydrAMINE 25 MG Oral Cap Take 1 capsule (25 mg total) by mouth every 6 (six) hours as needed (before reglan)., Normal, Disp-60 capsule, R-0      metoclopramide 5 MG Oral Tab Take 1 tablet (5 mg total) by mouth 4 (four) times daily before meals and nightly for 15 days. Prn nausea Take benadryl 30 minutes before, Normal, Disp-60 tablet, R-0

## 2024-03-21 ENCOUNTER — OFFICE VISIT (OUTPATIENT)
Dept: OBGYN CLINIC | Facility: CLINIC | Age: 24
End: 2024-03-21

## 2024-03-21 ENCOUNTER — HOSPITAL ENCOUNTER (EMERGENCY)
Facility: HOSPITAL | Age: 24
Discharge: HOME | End: 2024-03-21
Attending: EMERGENCY MEDICINE
Payer: COMMERCIAL

## 2024-03-21 VITALS
HEART RATE: 77 BPM | RESPIRATION RATE: 18 BRPM | TEMPERATURE: 98 F | DIASTOLIC BLOOD PRESSURE: 66 MMHG | OXYGEN SATURATION: 99 % | SYSTOLIC BLOOD PRESSURE: 123 MMHG

## 2024-03-21 VITALS
HEIGHT: 66 IN | WEIGHT: 160 LBS | HEART RATE: 86 BPM | BODY MASS INDEX: 25.71 KG/M2 | DIASTOLIC BLOOD PRESSURE: 82 MMHG | SYSTOLIC BLOOD PRESSURE: 135 MMHG

## 2024-03-21 DIAGNOSIS — O99.280: ICD-10-CM

## 2024-03-21 DIAGNOSIS — O21.9 NAUSEA AND VOMITING IN PREGNANCY (HCC): ICD-10-CM

## 2024-03-21 DIAGNOSIS — N92.6 MISSED MENSES: Primary | ICD-10-CM

## 2024-03-21 DIAGNOSIS — E86.0: ICD-10-CM

## 2024-03-21 DIAGNOSIS — Z32.00 PREGNANCY EXAMINATION OR TEST, PREGNANCY UNCONFIRMED: ICD-10-CM

## 2024-03-21 DIAGNOSIS — O21.9 NAUSEA AND VOMITING IN PREGNANCY (HCC): Primary | ICD-10-CM

## 2024-03-21 LAB
ANION GAP SERPL CALC-SCNC: 9 MMOL/L (ref 0–18)
APPEARANCE: CLEAR
BASOPHILS # BLD AUTO: 0.02 X10(3) UL (ref 0–0.2)
BASOPHILS NFR BLD AUTO: 0.3 %
BUN BLD-MCNC: <5 MG/DL (ref 9–23)
CALCIUM BLD-MCNC: 9.8 MG/DL (ref 8.7–10.4)
CHLORIDE SERPL-SCNC: 100 MMOL/L (ref 98–112)
CO2 SERPL-SCNC: 27 MMOL/L (ref 21–32)
CONTROL LINE PRESENT WITH A CLEAR BACKGROUND (YES/NO): YES YES/NO
CREAT BLD-MCNC: 0.65 MG/DL
DEPRECATED RDW RBC AUTO: 37.5 FL (ref 35.1–46.3)
EGFRCR SERPLBLD CKD-EPI 2021: 127 ML/MIN/1.73M2 (ref 60–?)
EOSINOPHIL # BLD AUTO: 0 X10(3) UL (ref 0–0.7)
EOSINOPHIL NFR BLD AUTO: 0 %
ERYTHROCYTE [DISTWIDTH] IN BLOOD BY AUTOMATED COUNT: 12.2 % (ref 11–15)
GLUCOSE (URINE DIPSTICK): NEGATIVE MG/DL
GLUCOSE BLD-MCNC: 82 MG/DL (ref 70–99)
HCT VFR BLD AUTO: 41.6 %
HGB BLD-MCNC: 14.6 G/DL
IMM GRANULOCYTES # BLD AUTO: 0.01 X10(3) UL (ref 0–1)
IMM GRANULOCYTES NFR BLD: 0.2 %
KETONES (URINE DIPSTICK): >=160 MG/DL
KIT LOT #: NORMAL NUMERIC
LYMPHOCYTES # BLD AUTO: 1.27 X10(3) UL (ref 1–4)
LYMPHOCYTES NFR BLD AUTO: 21.2 %
MCH RBC QN AUTO: 29.9 PG (ref 26–34)
MCHC RBC AUTO-ENTMCNC: 35.1 G/DL (ref 31–37)
MCV RBC AUTO: 85.2 FL
MONOCYTES # BLD AUTO: 0.82 X10(3) UL (ref 0.1–1)
MONOCYTES NFR BLD AUTO: 13.7 %
MULTISTIX LOT#: ABNORMAL NUMERIC
NEUTROPHILS # BLD AUTO: 3.86 X10 (3) UL (ref 1.5–7.7)
NEUTROPHILS # BLD AUTO: 3.86 X10(3) UL (ref 1.5–7.7)
NEUTROPHILS NFR BLD AUTO: 64.6 %
NITRITE, URINE: NEGATIVE
OCCULT BLOOD: NEGATIVE
PH, URINE: 6 (ref 4.5–8)
PLATELET # BLD AUTO: 364 10(3)UL (ref 150–450)
POTASSIUM SERPL-SCNC: 4.3 MMOL/L (ref 3.5–5.1)
PREGNANCY TEST, URINE: POSITIVE
PROTEIN (URINE DIPSTICK): 30 MG/DL
RBC # BLD AUTO: 4.88 X10(6)UL
SODIUM SERPL-SCNC: 136 MMOL/L (ref 136–145)
SPECIFIC GRAVITY: 1.02 (ref 1–1.03)
URINE-COLOR: YELLOW
UROBILINOGEN,SEMI-QN: 1 MG/DL (ref 0–1.9)
WBC # BLD AUTO: 6 X10(3) UL (ref 4–11)

## 2024-03-21 PROCEDURE — 96361 HYDRATE IV INFUSION ADD-ON: CPT

## 2024-03-21 PROCEDURE — 3079F DIAST BP 80-89 MM HG: CPT | Performed by: ADVANCED PRACTICE MIDWIFE

## 2024-03-21 PROCEDURE — 96374 THER/PROPH/DIAG INJ IV PUSH: CPT

## 2024-03-21 PROCEDURE — 85025 COMPLETE CBC W/AUTO DIFF WBC: CPT | Performed by: EMERGENCY MEDICINE

## 2024-03-21 PROCEDURE — 81025 URINE PREGNANCY TEST: CPT | Performed by: ADVANCED PRACTICE MIDWIFE

## 2024-03-21 PROCEDURE — 99284 EMERGENCY DEPT VISIT MOD MDM: CPT

## 2024-03-21 PROCEDURE — 99203 OFFICE O/P NEW LOW 30 MIN: CPT | Performed by: ADVANCED PRACTICE MIDWIFE

## 2024-03-21 PROCEDURE — 81002 URINALYSIS NONAUTO W/O SCOPE: CPT | Performed by: ADVANCED PRACTICE MIDWIFE

## 2024-03-21 PROCEDURE — 3075F SYST BP GE 130 - 139MM HG: CPT | Performed by: ADVANCED PRACTICE MIDWIFE

## 2024-03-21 PROCEDURE — 3008F BODY MASS INDEX DOCD: CPT | Performed by: ADVANCED PRACTICE MIDWIFE

## 2024-03-21 PROCEDURE — 80048 BASIC METABOLIC PNL TOTAL CA: CPT | Performed by: EMERGENCY MEDICINE

## 2024-03-21 RX ORDER — ONDANSETRON 4 MG/1
4 TABLET, ORALLY DISINTEGRATING ORAL EVERY 8 HOURS PRN
Qty: 60 TABLET | Refills: 1 | Status: SHIPPED | OUTPATIENT
Start: 2024-03-21

## 2024-03-21 RX ORDER — MELATONIN
400 DAILY
Qty: 30 TABLET | Refills: 2 | Status: SHIPPED | OUTPATIENT
Start: 2024-03-21

## 2024-03-21 RX ORDER — ONDANSETRON 2 MG/ML
4 INJECTION INTRAMUSCULAR; INTRAVENOUS ONCE
Status: COMPLETED | OUTPATIENT
Start: 2024-03-21 | End: 2024-03-21

## 2024-03-21 RX ORDER — ONDANSETRON 4 MG/1
4 TABLET, ORALLY DISINTEGRATING ORAL EVERY 4 HOURS PRN
Qty: 15 TABLET | Refills: 0 | Status: SHIPPED | OUTPATIENT
Start: 2024-03-21

## 2024-03-21 NOTE — ED INITIAL ASSESSMENT (HPI)
Pt presents to ed from midwives office. Pt states the midwives office for \"fluids and nausea medication.\" Pt states she was in the ER two weeks ago for the same thing, reports she has not  been able to keep anything down for a month.  Pt states n/v worsens when she tries to drink anything.    8 weeks pregnant

## 2024-03-21 NOTE — PROGRESS NOTES
Subjective:   Patient ID: Marek Odonnell is a 23 year old female.    Marek presents for missed menses visit. LMP 1/22/24 with regular menses.  Had some implantation spotting but nothing since.    Vomiting every time she wakes up. Vomiting at least 5 times per day. Can only keep down gatorade. Has lost 10#. Has been to ED multiple times. States that she receives IV hydration and antiemetics and is then able to tolerate a small amount of food and she is discharged home. She has tried B6 and Unisom, zofran and reglan but has been unable to keep anything down. Unable to keep prenatal vitamins down as well.     Medical history significant for fibromyalgia and hypermobility disorder.        History/Other:   Review of Systems   Constitutional:  Positive for fatigue. Negative for fever.   Gastrointestinal:  Positive for nausea and vomiting.   Genitourinary:  Negative for pelvic pain and vaginal bleeding.   All other systems reviewed and are negative.    Current Outpatient Medications   Medication Sig Dispense Refill    ondansetron 4 MG Oral Tablet Dispersible Take 1 tablet (4 mg total) by mouth every 8 (eight) hours as needed for Nausea. 60 tablet 1    folic acid 400 MCG Oral Tab Take 1 tablet (400 mcg total) by mouth daily. 30 tablet 2    diphenhydrAMINE 25 MG Oral Cap Take 1 capsule (25 mg total) by mouth every 6 (six) hours as needed (before reglan). (Patient not taking: Reported on 3/21/2024) 60 capsule 0    metoclopramide 5 MG Oral Tab Take 1 tablet (5 mg total) by mouth 4 (four) times daily before meals and nightly for 15 days. Prn nausea Take benadryl 30 minutes before (Patient not taking: Reported on 3/21/2024) 60 tablet 0     Allergies:  Allergies   Allergen Reactions    Prednisone HIVES    Latex RASH       Objective:   Physical Exam  Vitals and nursing note reviewed.   Constitutional:       General: She is not in acute distress.     Appearance: Normal appearance. She is ill-appearing. She is not toxic-appearing or  diaphoretic.   HENT:      Mouth/Throat:      Mouth: Mucous membranes are dry.   Cardiovascular:      Pulses: Normal pulses.   Pulmonary:      Effort: Pulmonary effort is normal.   Neurological:      Mental Status: She is alert and oriented to person, place, and time.   Psychiatric:         Mood and Affect: Mood normal.         Behavior: Behavior normal.         Thought Content: Thought content normal.         Judgment: Judgment normal.         Assessment & Plan:   1. Missed menses    2. Pregnancy examination or test, pregnancy unconfirmed    3. Dehydration during pregnancy (HCC)    4. Nausea and vomiting in pregnancy (HCC)        Orders Placed This Encounter   Procedures    POC Urine pregnancy test [23827]    POC Urinalysis, Manual Dip without microscopy [39154]       Meds This Visit:  Requested Prescriptions     Signed Prescriptions Disp Refills    ondansetron 4 MG Oral Tablet Dispersible 60 tablet 1     Sig: Take 1 tablet (4 mg total) by mouth every 8 (eight) hours as needed for Nausea.    folic acid 400 MCG Oral Tab 30 tablet 2     Sig: Take 1 tablet (400 mcg total) by mouth daily.       Imaging & Referrals:  None    1.  Discussed importance of folic acid, calcium, vitamin D.   2.  Reviewed pregnancy recommendations regarding weight gain, diet, fish consumption, consumption of deli-meats and hot dogs, making sure food is appropriately cooked and washed to avoid food-borne illnesses.    3.  Travel discussed, avoid travel to Covid zones, use of support stockings with flights longer than 3 hours, movement every 2-3 hours if driving  4.  Discussed exercise, avoid jacuzzi, sauna, hot tubs and steam rooms.   5.  Discussed avoidance of alcohol, smoking, and minimizing caffeine.    6.  Warning signs reviewed advised to call office if occur.    7.  First Trimester chromosomal screening Cell free Fetal DNA and US schedule discussed.  Desires First trimester screen testing  8. Genetic screening and ACOG/ ACMG guidelines for  CF, SMA, Fragile X & hemoglobinopathies.  Informrd our offices uses the Preparent Standard Panel but other sources are available.  If interested they should check their insurance and various costs  9. ONTD risks discussed. No risk factors identified.   10. Pre- Eclampsia Risk Assessment:   High risk Factors- No  Moderate Risk Factors: Nullip    Recommend proceeding to ED for IV fluids and antiemetics. Will send dissolvable zofran to pharmacy. If stable for discharge from ED, recommend then taking zofran around the clock for nausea and vomiting. If still unable to keep anything down after this, instructed her to call office. Discussed possible need for inpatient treatment and stabilization.    12.  Schedule RN OB ED visit   13.  30 minutes face to face counseling, chart review, orders and coordination of care

## 2024-03-22 NOTE — ED PROVIDER NOTES
Patient Seen in: [facility] Emergency Department    History     Chief Complaint   Patient presents with    Pregnancy Issues       HPI    The patient presents to the ED from her midwife's office stating that she has been nauseous and vomiting persistently for the past month and was told to come to the ER for IV fluids.  Denies any abdominal pain, vaginal bleeding or other complaints.  Currently weeks pregnant.    History reviewed.   Past Medical History:   Diagnosis Date    Autism (HCC)     Fibromyalgia     Fibromyalgia        History reviewed. History reviewed. No pertinent surgical history.      Medications :  (Not in a hospital admission)       No family history on file.    Smoking Status:   Social History     Socioeconomic History    Marital status:    Tobacco Use    Smoking status: Never    Smokeless tobacco: Never   Vaping Use    Vaping Use: Never used   Substance and Sexual Activity    Alcohol use: Never    Drug use: Never       Constitutional and vital signs reviewed.      Social History and Family History elements reviewed from today, pertinent positives to the presenting problem noted.    Physical Exam     ED Triage Vitals [03/21/24 1616]   /76   Pulse 106   Resp 20   Temp 97.6 °F (36.4 °C)   Temp src Temporal   SpO2 98 %   O2 Device None (Room air)       All measures to prevent infection transmission during my interaction with the patient were taken. Handwashing was performed prior to and after the exam.  Stethoscope and any equipment used during my examination was cleaned with super sani-cloth germicidal wipes following the exam.     Physical Exam  Vitals and nursing note reviewed.   Constitutional:       General: She is not in acute distress.     Appearance: She is well-developed.   HENT:      Head: Normocephalic and atraumatic.   Neck:      Trachea: No tracheal deviation.   Cardiovascular:      Rate and Rhythm: Normal rate and regular rhythm.   Pulmonary:      Effort: Pulmonary  effort is normal. No respiratory distress.   Abdominal:      General: There is no distension.      Palpations: Abdomen is soft.      Tenderness: There is no abdominal tenderness.   Skin:     General: Skin is warm and dry.   Neurological:      Mental Status: She is alert and oriented to person, place, and time.   Psychiatric:         Mood and Affect: Mood normal.         Behavior: Behavior normal.         ED Course        Labs Reviewed   BASIC METABOLIC PANEL (8) - Abnormal; Notable for the following components:       Result Value    BUN <5 (*)     All other components within normal limits   CBC WITH DIFFERENTIAL WITH PLATELET    Narrative:     The following orders were created for panel order CBC With Differential With Platelet.                  Procedure                               Abnormality         Status                                     ---------                               -----------         ------                                     CBC W/ DIFFERENTIAL[[id number]]                              Final result                                                 Please view results for these tests on the individual orders.   CBC W/ DIFFERENTIAL       As Interpreted by me    Imaging Results Available and Reviewed while in ED: No results found.  ED Medications Administered:   Medications   sodium chloride 0.9 % IV bolus 1,000 mL (0 mL Intravenous Stopped 3/21/24 1956)   ondansetron (Zofran) 4 MG/2ML injection 4 mg (4 mg Intravenous Given 3/21/24 1856)         MDM     Vitals:    03/21/24 1616 03/21/24 1845 03/21/24 1900 03/21/24 2030   BP: 119/76 121/73 109/67 123/66   Pulse: 106 85 72 77   Resp: 20   18   Temp: 97.6 °F (36.4 °C)      TempSrc: Temporal      SpO2: 98% 100% 100% 99%     *I personally reviewed and interpreted all ED vitals.    Pulse Ox: 99%, Room air, Normal       Differential Diagnosis/ Diagnostic Considerations: Nausea vomiting pregnancy, dehydration, CARMEN, other    Complicating Factors: The patient  already has does not have any pertinent problems on file. to contribute to the complexity of this ED evaluation.    Medical Decision Making  The patient presents to the ED with nausea and vomiting of pregnancy.  Laboratory testing without concerning findings and patient improved significantly with IV fluids and antiemetics in the ED.  Stable for discharge home with continued antiemetics and outpatient follow-up.    Problems Addressed:  Nausea and vomiting in pregnancy (HCC): acute illness or injury    Amount and/or Complexity of Data Reviewed  Labs: ordered. Decision-making details documented in ED Course.    Risk  Prescription drug management.        Condition upon leaving the department: Stable    Disposition and Plan     Clinical Impression:  1. Nausea and vomiting in pregnancy (HCC)        Disposition:  Discharge    Follow-up:  [facility] Emergency Department  [address] [facility] [address]  [phone]  Follow up  If symptoms worsen      Medications Prescribed:  Discharge Medication List as of 3/21/2024  8:37 PM        START taking these medications    Details   !! ondansetron 4 MG Oral Tablet Dispersible Take 1 tablet (4 mg total) by mouth every 4 (four) hours as needed for Nausea., Normal, Disp-15 tablet, R-0      folic acid 400 MCG Oral Tab Take 1 tablet (400 mcg total) by mouth daily., Normal, Disp-30 tablet, R-2       !! - Potential duplicate medications found. Please discuss with provider.

## 2024-03-23 ENCOUNTER — TELEPHONE (OUTPATIENT)
Dept: OBGYN CLINIC | Facility: CLINIC | Age: 24
End: 2024-03-23

## 2024-04-03 ENCOUNTER — NURSE ONLY (OUTPATIENT)
Dept: OBGYN CLINIC | Facility: CLINIC | Age: 24
End: 2024-04-03

## 2024-04-03 DIAGNOSIS — Z34.01 ENCOUNTER FOR SUPERVISION OF NORMAL FIRST PREGNANCY IN FIRST TRIMESTER (HCC): Primary | ICD-10-CM

## 2024-04-03 NOTE — PROGRESS NOTES
Phone Nurse Education Completed  PT verbalized understanding     OB labs placed in results console  Needs- A1C,  TSH- Hyperemesis, Urine Culture      Abnormal Pap- No  Last Pap-2/3/23  Genetic Testing- First Trimester Screen with MFM   Circumcision- Desires  Feeding-Breast- Noted   Emergency Transfusion-  Agreeable   EPDS-0  Type of birth- No Epidural   [facility]- Website     Consults Placed- [facility] for first trimester screen      Pt. Has answered NO 5P questions and has NO  risk factors.      Pt. Given What pregnant women need to know handout.        Patient verbalized understanding that [employer] messaging is to be used for non-urgent medical questions, and to call the office with any vaginal bleeding, leaking of fluid, cramping, decreased fetal movement, or any other urgent medical question.

## 2024-04-18 ENCOUNTER — HOSPITAL ENCOUNTER (OUTPATIENT)
Dept: PERINATAL CARE | Facility: HOSPITAL | Age: 24
Discharge: HOME | End: 2024-04-18
Attending: OBSTETRICS & GYNECOLOGY
Payer: COMMERCIAL

## 2024-04-18 ENCOUNTER — LAB ENCOUNTER (OUTPATIENT)
Dept: LAB | Facility: HOSPITAL | Age: 24
End: 2024-04-18
Attending: ADVANCED PRACTICE MIDWIFE
Payer: COMMERCIAL

## 2024-04-18 ENCOUNTER — INITIAL PRENATAL (OUTPATIENT)
Dept: OBGYN CLINIC | Facility: CLINIC | Age: 24
End: 2024-04-18

## 2024-04-18 ENCOUNTER — HOSPITAL ENCOUNTER (OUTPATIENT)
Dept: PERINATAL CARE | Facility: HOSPITAL | Age: 24
Discharge: HOME | End: 2024-04-18
Attending: ADVANCED PRACTICE MIDWIFE
Payer: COMMERCIAL

## 2024-04-18 VITALS
DIASTOLIC BLOOD PRESSURE: 76 MMHG | HEART RATE: 89 BPM | SYSTOLIC BLOOD PRESSURE: 120 MMHG | BODY MASS INDEX: 25 KG/M2 | WEIGHT: 153 LBS

## 2024-04-18 VITALS
SYSTOLIC BLOOD PRESSURE: 107 MMHG | WEIGHT: 153 LBS | DIASTOLIC BLOOD PRESSURE: 74 MMHG | HEART RATE: 92 BPM | BODY MASS INDEX: 25 KG/M2

## 2024-04-18 DIAGNOSIS — O26.899 PELVIC PAIN IN PREGNANCY (HCC): ICD-10-CM

## 2024-04-18 DIAGNOSIS — M35.7 HYPERMOBILITY SYNDROME: ICD-10-CM

## 2024-04-18 DIAGNOSIS — Z34.91 PRENATAL CARE, FIRST TRIMESTER (HCC): ICD-10-CM

## 2024-04-18 DIAGNOSIS — Z34.01 ENCOUNTER FOR SUPERVISION OF NORMAL FIRST PREGNANCY IN FIRST TRIMESTER (HCC): ICD-10-CM

## 2024-04-18 DIAGNOSIS — R10.2 PELVIC PAIN IN PREGNANCY (HCC): ICD-10-CM

## 2024-04-18 DIAGNOSIS — Z36.9 FIRST TRIMESTER SCREENING (HCC): Primary | ICD-10-CM

## 2024-04-18 DIAGNOSIS — Z11.3 SCREENING EXAMINATION FOR STD (SEXUALLY TRANSMITTED DISEASE): Primary | ICD-10-CM

## 2024-04-18 DIAGNOSIS — O21.9 NAUSEA AND VOMITING IN PREGNANCY (HCC): ICD-10-CM

## 2024-04-18 DIAGNOSIS — O99.891 BACK PAIN IN PREGNANCY (HCC): ICD-10-CM

## 2024-04-18 DIAGNOSIS — Z36.9 FIRST TRIMESTER SCREENING (HCC): ICD-10-CM

## 2024-04-18 DIAGNOSIS — M54.9 BACK PAIN IN PREGNANCY (HCC): ICD-10-CM

## 2024-04-18 PROBLEM — M24.80 GENERALIZED HYPERMOBILITY OF JOINTS: Status: ACTIVE | Noted: 2024-04-18

## 2024-04-18 PROBLEM — M79.7 FIBROMYALGIA: Status: ACTIVE | Noted: 2024-04-18

## 2024-04-18 LAB
EST. AVERAGE GLUCOSE BLD GHB EST-MCNC: 88 MG/DL (ref 68–126)
HBA1C MFR BLD: 4.7 % (ref ?–5.7)
T4 FREE SERPL-MCNC: 1.2 NG/DL (ref 0.8–1.7)
TSI SER-ACNC: 1.38 MIU/ML (ref 0.55–4.78)

## 2024-04-18 PROCEDURE — 3074F SYST BP LT 130 MM HG: CPT | Performed by: ADVANCED PRACTICE MIDWIFE

## 2024-04-18 PROCEDURE — 84443 ASSAY THYROID STIM HORMONE: CPT | Performed by: ADVANCED PRACTICE MIDWIFE

## 2024-04-18 PROCEDURE — 96127 BRIEF EMOTIONAL/BEHAV ASSMT: CPT | Performed by: ADVANCED PRACTICE MIDWIFE

## 2024-04-18 PROCEDURE — 3078F DIAST BP <80 MM HG: CPT | Performed by: ADVANCED PRACTICE MIDWIFE

## 2024-04-18 PROCEDURE — 76813 OB US NUCHAL MEAS 1 GEST: CPT | Performed by: OBSTETRICS & GYNECOLOGY

## 2024-04-18 PROCEDURE — 83036 HEMOGLOBIN GLYCOSYLATED A1C: CPT | Performed by: ADVANCED PRACTICE MIDWIFE

## 2024-04-18 PROCEDURE — 84439 ASSAY OF FREE THYROXINE: CPT | Performed by: ADVANCED PRACTICE MIDWIFE

## 2024-04-18 PROCEDURE — 87086 URINE CULTURE/COLONY COUNT: CPT | Performed by: ADVANCED PRACTICE MIDWIFE

## 2024-04-18 RX ORDER — CHOLECALCIFEROL (VITAMIN D3) 25 MCG
1 TABLET,CHEWABLE ORAL DAILY
COMMUNITY

## 2024-04-18 NOTE — PROGRESS NOTES
Here for NOB visit. Denies bleeding or pelvic pain     Patient's last menstrual period was 2024 (exact date). 10/28/2024, Date entered prior to episode creation 12w3d     Nausea has improved. Able to control with eating regularly. Has stopped taking zofran regularly a week ago. Discussed weight loss. Will monitor closely now that she is not as sick.    Has been constipated. Was worse with zofran. Did saline enema this morning which allowed her to go. Has tried diet changes. Recommend fiber, water, colace, magnesium,     Reports pelvic and back pain. Will place order for PT    NOB labs- done and normal  Genetic screening- done this morning  Ultrasound: First trimester screen completed this morning.  Med hx: Hypermobility and fibromyalgia  Allergies: Predinose and latex  Problem list- Updated    EPDS=3  GAD7-0  Has some anxiety but managed well. Feels zofran was making anxiety worse.     Pre-e risk: nullip  Prior births: n/a    Physical: WNL  Pap: up to date. 2/3/23: NILM    Warning signs reviewed.

## 2024-04-18 NOTE — PROGRESS NOTES
Outpatient Maternal-Fetal Medicine Consultation    Dear Ms. Alvarez,    Thank you for requesting ultrasound evaluation and maternal fetal medicine consultation on your patient Marek Odonnell.  As you are aware she is a 24 year old female with a Wynn pregnancy at 12w3d.  A maternal-fetal medicine consultation was requested secondary to desire for aneuploidy screening, hyperemesis, and hypermobility syndrome.  Her prenatal records and labs were reviewed.    HISTORY  OB History    Para Term  AB Living   1 0 0 0 0 0   SAB IAB Ectopic Multiple Live Births   0 0 0 0 0     # 1 - Date: None, Sex: None, Weight: None, GA: None, Type: None, Apgar1: None, Apgar5: None, Living: None, Birth Comments: None    Past Medical History  The patient  has a past medical history of Autism (HCC), Fibromyalgia, and Fibromyalgia.    Past Surgical History  The patient  has no past surgical history on file.    Family History  The patient She indicated that the status of her mother is unknown. She indicated that the status of her father is unknown.      Medications:   Current Outpatient Medications:     ondansetron 4 MG Oral Tablet Dispersible, Take 1 tablet (4 mg total) by mouth every 8 (eight) hours as needed for Nausea., Disp: 60 tablet, Rfl: 1    folic acid 400 MCG Oral Tab, Take 1 tablet (400 mcg total) by mouth daily., Disp: 30 tablet, Rfl: 2    ondansetron 4 MG Oral Tablet Dispersible, Take 1 tablet (4 mg total) by mouth every 4 (four) hours as needed for Nausea., Disp: 15 tablet, Rfl: 0    diphenhydrAMINE 25 MG Oral Cap, Take 1 capsule (25 mg total) by mouth every 6 (six) hours as needed (before reglan). (Patient not taking: Reported on 3/21/2024), Disp: 60 capsule, Rfl: 0  Allergies:   Allergies   Allergen Reactions    Prednisone HIVES    Latex RASH         PHYSICAL EXAMINATION:  /76   Pulse 89   Wt 153 lb (69.4 kg)   LMP 2024 (Exact Date)   BMI 24.69 kg/m²   General: alert and oriented,no acute  distress  Abdomen: gravid, soft, non-tender  Extremities: non-tender, no edema      OBSTETRIC ULTRASOUND  The patient had a first trimester ultrasound today which I interpreted the results and reviewed them with the patient.    Single IUP with cardiac activity 160 bpm  Amniotic fluid is normal.  Placenta location is posterior  The CRL is consistent with gestational age. Nasal bone present. The nuchal translucency measures 1.4 mm. This is within normal limits.   The maternal uterus and ovaries appear normal.    See imaging tab for the complete US report.    DISCUSSION  During her visit we discussed and reviewed the following issues:  Non-invasive Pregnancy Testing (NIPT)  I reviewed current non-invasive screening options. Currently non-invasive pregnancy testing (NIPT) offers the highest detection rate (with the lowest false positive rate) for the detection of fetal aneuploidy amongst high-risk patients. The limitations of detailed mid-trimester sonography was reviewed with the patient. First trimester screening and second trimester multiple-marker serum serum screening as alternative aneuploidy screening options were also reviewed. However, both of these tests are associated with lower detection and higher false positive rates.     Hyperemesis gravidarum is characterized by nausea and vomiting which can be severe and require hospitalization.  Some N/V occurs in 50-90% of pregnancies.  The onset is typically at 5-6wks and peak by 9wks.  Most resolve by 16wks.  About 20% may continue beyond this point and 5% to term.  Hyperemesis is defined as persistent vomiting with 5% prepregnancy weight loss.    Patients may present with ketosis and electrolyte imbalance.  Abnormal liver enzymes may also be present.  Hyperthyroidism has been kiko in association with hyperemesis but is typically transient.  Muscle weakness can be a sign of hyperthyroidism.  If the calcium levels are elevated, hyperparathyroidism should be  considered.  Psychologic factors have been proposed to contribute to hyperemesis and hyperemesis can exacerbate the psychologic condition.    Treatment usually consists of hydration and low fat/ high carbohydrate diet.  Home IV therapy may be needed.  Antiemetic drugs have been shown to be useful.  If N/V persists longer than 3wks, thiamine supplementation is recommended.  Corticosteroids have been used in severe cases.  Vitamin B6 10-25mg TID may be helpful.  Antihistamines may be taken with the B6.       Nonpharmacologic treatments include avoidance of triggers such as eating saltly or high protein meals  Aromatic therapiees such as lemon, mint or orange.  Fern(powdered 1 gram daily) has also been used.  Avoid iron supplements.  P6 acupuncture or acupressure wristbands have been useful in some women.    Despite the severity, there is typically no difference in birth weight except in those requiring frequent hospitalizations.     Hyperemesis is improving.  She has been able to come off the Zofran as of late.    OBESITY:  Her BMI prior to pregnancy was 30  Obesity during pregnancy is associated with numerous maternal and  risks.  It is not clear whether obesity is a direct cause of adverse pregnancy outcome or whether the association between obesity and adverse pregnancy outcome is due to factors such as diabetes mellitus.   Data suggest that obese women should be encouraged to undertake a weight reduction program (diet, exercise, behavior modification, and possibly bariatric surgery in some cases) prior to attempting to conceive.            Subfertility in obese women is most commonly related to ovulatory dysfunction, and, in some obese women, the ovulatory dysfunction is related to polycystic ovary syndrome (PCOS). It is also important to note that even among ovulatory women, increasing obesity is associated with decreasing spontaneous pregnancy rates.  The increased risk of miscarriage in obese women  may be because such women often have PCOS or isolated insulin resistance.                 Due to its strong association with obesity in the general population, type 2 diabetes mellitus is one of the two most common medical complications of the obese . The increased risk of type 2 diabetes is primarily related to an exaggerated increase in insulin resistance in the obese state. It is reasonable to screen obese gravidas for undiagnosed pregestational diabetes in the first trimester.   Glucose intolerance associated with gestational diabetes generally resolves postpartum; however, obese women with a history of gestational diabetes have a two-fold increased prevalence of subsequent type 2 diabetes.           An association between obesity and hypertensive disorders during pregnancy has been consistently reported.  In particular, maternal weight and BMI are independent risk factors for preeclampsia.             Studies have found that the increased risk of  birth in obese gravidas is primarily associated with obesity-related medical and  complications, rather than an intrinsic predisposition to spontaneous  birth. Prevention of  birth in these patients, therefore, should be directed toward prevention or management of medical and obstetrical complications.               Both prepregnancy obesity and excessive maternal weight gain before or during pregnancy contribute to an increased probability of  delivery.  It has also been hypothesized that obesity may lead to dystocia due to increased soft tissue deposition in the maternal pelvis.    delivery in the obese  is associated with numerous perioperative concerns, including emergency delivery, prolonged incision to delivery interval, blood loss >1000 mL, longer operative times, wound infection, thromboembolism, and endometritis.            Maternal obesity appears to be associated with a small increase in the  absolute rate of some congenital anomalies (primarily neural tube defect and cardiac), and the risk may increase with increasing maternal weight.  Level II ultrasound is advised for women with obesity.  The risk of neural tube defects increased significantly with maternal weight.    The analysis found that overweight and obese pregnant women experienced significantly more stillbirths than normal weight women.  Third trimester  testing is advised.    Hypermobility spectrum disorder not otherwise categorized -this diagnosis is made by rheumatologist 2 to 3 years ago.  She is going to be seeing a specialist at [facility] on May 7, 2024.  She is not certain if this is  María Elena-Danlos  syndrome or not.  There are no family members with these symptoms.    We discussed if she is diagnosed with María Elena-Danlos syndrome, she should have an echocardiogram to make sure there are no cardiac manifestations or vascular manifestations of the condition.  We discussed that if someone has the hypermobility form, the likelihood of aortic involvement is very low but not 0.    We discussed the recommended plan of care based on her  risk factors.  Marek and her significant other, Mandeep, had their questions answered to their satisfaction.      IMPRESSION:  IUP at 12w3d  Measurements are consistent with dates, normal NT measurement  Hyperemesis gravidarum  Hypermobility spectrum disorder -seeing a specialist on May 7, 2024 at [facility] for more formal diagnostic evaluation    RECOMMENDATIONS:  Continue care with Ms. Alvarez  Level 2 ultrasound at 20 weeks  Third trimester growth and BPP at 32 weeks  Weekly NST at 36 weeks  Total gestational weight gain should be less than 20 pounds from baseline  If she is diagnosed with María Elena-Danlos syndrome, maternal echocardiogram is advised    Total time spent 40 minutes this calendar day which includes preparing to see the patient including chart review, obtaining and/or reviewing additional  medical history, performing a physical exam and evaluation, documenting clinical information in the electronic medical record, independently interpreting results, counseling the patient, communicating results to the patient/family/caregiver and coordinating care.     Case discussed with patient who demonstrated understanding and agreement with plan.     Thank you for allowing me to participate in the care of this patient.  Please feel free to contact me with any questions.    Bozena Saeed MD  Maternal-Fetal Medicine       Note to patient and family:  The 21st Century Cures Act makes medical notes available to patients in the interest of transparency.  However, please be advised that this is a medical document.  It is intended as a peer to peer communication.  It is written in medical language and may contain abbreviations or verbiage that are technical and unfamiliar.  It may appear blunt or direct.  Medical documents are intended to carry relevant information, facts as evident, and the clinical opinion of the practitioner.

## 2024-04-19 LAB
C TRACH DNA SPEC QL NAA+PROBE: NEGATIVE
N GONORRHOEA DNA SPEC QL NAA+PROBE: NEGATIVE

## 2024-04-22 LAB
GESTATIONAL AGE > OR = 9W:: YES
MONOSOMY X (TURNER SYNDROME): NOT DETECTED
TEST RESULT: NEGATIVE
TRISOMY 13 (PATAU SYNDROME): NEGATIVE
TRISOMY 18 (EDWARDS SYNDROME): NEGATIVE
TRISOMY 21 (DOWN SYNDROME): NEGATIVE
XXX (TRIPLE X SYNDROME): NOT DETECTED
XXY (KLINEFELTER SYNDROME): NOT DETECTED
XYY (JACOBS SYNDROME): NOT DETECTED

## 2024-04-23 ENCOUNTER — TELEPHONE (OUTPATIENT)
Dept: OBGYN CLINIC | Facility: CLINIC | Age: 24
End: 2024-04-23

## 2024-04-23 NOTE — TELEPHONE ENCOUNTER
----- Message from Lottie Tijerina CNM sent at 4/23/2024 11:24 AM CDT -----  Please call patient. NIPT is low risk. Predicted fetal sex is male if she desires to know. Thanks!  ----- Message -----  From: Bozena Saeed MD  Sent: 4/23/2024   8:49 AM CDT  To: Lottie Tijerina CNM    Low risk cell free DNA screening.  The sex of the fetus is noted on the lab report per patient request

## 2024-05-16 ENCOUNTER — ROUTINE PRENATAL (OUTPATIENT)
Dept: OBGYN CLINIC | Facility: CLINIC | Age: 24
End: 2024-05-16

## 2024-05-16 VITALS
BODY MASS INDEX: 25 KG/M2 | HEART RATE: 67 BPM | DIASTOLIC BLOOD PRESSURE: 61 MMHG | WEIGHT: 156.38 LBS | SYSTOLIC BLOOD PRESSURE: 96 MMHG

## 2024-05-16 DIAGNOSIS — Z34.92 PRENATAL CARE, SECOND TRIMESTER (HCC): Primary | ICD-10-CM

## 2024-05-16 DIAGNOSIS — M35.7 HYPERMOBILITY SYNDROME: ICD-10-CM

## 2024-05-16 DIAGNOSIS — R52 GENERALIZED PAIN: ICD-10-CM

## 2024-05-16 PROCEDURE — 3078F DIAST BP <80 MM HG: CPT | Performed by: ADVANCED PRACTICE MIDWIFE

## 2024-05-16 PROCEDURE — 3074F SYST BP LT 130 MM HG: CPT | Performed by: ADVANCED PRACTICE MIDWIFE

## 2024-06-05 ENCOUNTER — MOBILE ENCOUNTER (OUTPATIENT)
Dept: OBGYN CLINIC | Facility: CLINIC | Age: 24
End: 2024-06-05

## 2024-06-06 NOTE — PROGRESS NOTES
Pc from pt. Reports right upper quadrant pain. States feels in right side under ribs and wraps around to back. Was severe and has now decreased and not as bad. Denies fevers, chills, dysuria or urgency. Is 19 wks. Denies low abdominal pain, bleeding or LOF. Had pizza for dinner around 9. Advised possible pain r/t gallstones. Recommend take Tylenol & warm compresses or bath. Avoid greasy, fatty or spicy food. If pain becomes severe recommend evaluation in ER. If remains mild in the morning to call office to be seen for evaluation. Agrees with plan.

## 2024-06-12 NOTE — PROGRESS NOTES
Outpatient Maternal-Fetal Medicine Follow-Up     Dear Ms. Alvarez,     Thank you for requesting ultrasound evaluation and maternal fetal medicine consultation on your patient Marek Odonnell.  As you are aware she is a 24 year old female  with a Wynn pregnancy and an Estimated Date of Delivery: 10/28/24.  She returned to maternal-fetal medicine today for a follow-up visit.  Her history was reviewed from her prior visit and there were no interval changes.     Antepartum Risk Factors  Hypermobility  - evaluated by Dr. Maggie Hutton at [facility]; PT and echocardiogram: from the note:  \"does not suggest a more wide spread connective tissue disorder, such as hypermobile or vascular EDS, but will get an echocardiogram to evaluate further.\"  Hyperemesis, improved  Prepregnancy BMI 30    S: N/V improved, she lost a total of 35 lbs and has started to gain some back .  Has echocardiogram scheduled for July.  Trying to schedule PT but waiting on insurance coverage    PHYSICAL EXAMINATION:  /64   Pulse 68   Wt 154 lb (69.9 kg)   LMP 2024 (Exact Date)   BMI 24.86 kg/m²   General: alert and oriented, no acute distress  Abdomen: gravid, soft, non-tender  Extremities: non-tender, no edema     OBSTETRIC ULTRASOUND  The patient had a level 2 ultrasound today which I interpreted the results and reviewed them with the patient.    Ultrasound Findings:  Single IUP in breech presentation.    Placenta is posterior.   A 3 vessel cord is noted.  Cardiac activity is present at 146 bpm   g ( 0 lb 12 oz);   MVP is 4.1 cm .     The fetal measurements are consistent with the established EDC. No ultrasound evidence of structural abnormalities are seen today. The nasal bone is present. No ultrasound evidence of markers for aneuploidy are seen. She understands that ultrasound exam cannot exclude genetic abnormalities and that genetic testing is recommended. The limitations of ultrasound were discussed.     Uterus  and adnexa appeared normal  today on US    See imaging tab for the complete US report.     DISCUSSION  During her visit we discussed and reviewed the following issues:  Non-invasive Pregnancy Testing (NIPT)  I reviewed her low risk results and the limitations of the screen.        Hyperemesis gravidarum    Hyperemesis is improving.  She has been able to come off the Zofran as of late.  See [facility] note from 1824 for detailed review.     OBESITY:  Her BMI prior to pregnancy was 30  Obesity during pregnancy is associated with numerous maternal and  risks which were discussed previously and reviewed. See [facility] note from 1824 for detailed review.     Hypermobility spectrum disorder not otherwise categorized -this diagnosis is made by rheumatologist 2 to 3 years ago.  She is going to be seeing a specialist at [facility] on May 7, 2024.  She is not certain if this is  María Elena-Danlos  syndrome or not.  There are no family members with these symptoms.     We reviewed if she is diagnosed with María Elena-Danlos syndrome, she should have an echocardiogram to make sure there are no cardiac manifestations or vascular manifestations of the condition.  We discussed that if someone has the hypermobility form, the likelihood of aortic involvement is very low.  I reviewed with her that the neurologist she met with at [facility] also advised that she have an echocardiogram     We discussed the recommended plan of care based on her  risk factors.  Marek and her significant other, Mandeep, had their questions answered to their satisfaction.        IMPRESSION:  IUP at 20w3d  Normal level II ultrasound  Hyperemesis gravidarum, improved  High BMI  Hypermobility spectrum disorder -saw a specialist on May 7, 2024 notes the general hypermobility but the rest of the exam is \"not suggestive of a more wide spread connective tissue disorder, such as hypermobile or vascular EDS, but will get an echocardiogram to evaluate further.\"      RECOMMENDATIONS:  Continue care with Ms. Alvarez  Third trimester growth and BPP at 32 weeks  Weekly NST at 36 weeks  Total gestational weight gain should be less than 20 pounds from baseline  If she is diagnosed with María Elena-Danlos syndrome, maternal echocardiogram is advised        Total time spent 40 minutes this calendar day which includes preparing to see the patient including chart review, obtaining and/or reviewing additional medical history, performing a physical exam and evaluation, documenting clinical information in the electronic medical record, independently interpreting results, counseling the patient, communicating results to the patient/family/caregiver and coordinating care.     Case discussed with patient who demonstrated understanding and agreement with plan.     Thank you for allowing me to participate in the care of this patient.  Please feel free to contact me with any questions.    Bozena Saeed MD  Maternal-Fetal Medicine       Note to patient and family:  The 21st Century Cures Act makes medical notes available to patients in the interest of transparency.  However, please be advised that this is a medical document.  It is intended as a peer to peer communication.  It is written in medical language and may contain abbreviations or verbiage that are technical and unfamiliar.  It may appear blunt or direct.  Medical documents are intended to carry relevant information, facts as evident, and the clinical opinion of the practitioner.

## 2024-06-13 ENCOUNTER — HOSPITAL ENCOUNTER (OUTPATIENT)
Dept: PERINATAL CARE | Facility: HOSPITAL | Age: 24
Discharge: HOME | End: 2024-06-13
Attending: OBSTETRICS & GYNECOLOGY
Payer: COMMERCIAL

## 2024-06-13 ENCOUNTER — ROUTINE PRENATAL (OUTPATIENT)
Dept: OBGYN CLINIC | Facility: CLINIC | Age: 24
End: 2024-06-13

## 2024-06-13 VITALS
DIASTOLIC BLOOD PRESSURE: 75 MMHG | WEIGHT: 155 LBS | HEART RATE: 86 BPM | BODY MASS INDEX: 25 KG/M2 | SYSTOLIC BLOOD PRESSURE: 110 MMHG

## 2024-06-13 VITALS
WEIGHT: 154 LBS | DIASTOLIC BLOOD PRESSURE: 64 MMHG | HEART RATE: 68 BPM | SYSTOLIC BLOOD PRESSURE: 101 MMHG | BODY MASS INDEX: 25 KG/M2

## 2024-06-13 DIAGNOSIS — Z34.02 ENCOUNTER FOR SUPERVISION OF NORMAL FIRST PREGNANCY IN SECOND TRIMESTER (HCC): Primary | ICD-10-CM

## 2024-06-13 DIAGNOSIS — M24.80 GENERALIZED HYPERMOBILITY OF JOINTS: ICD-10-CM

## 2024-06-13 DIAGNOSIS — Z36.89 ENCOUNTER FOR FETAL ANATOMIC SURVEY (HCC): ICD-10-CM

## 2024-06-13 DIAGNOSIS — O21.0: ICD-10-CM

## 2024-06-13 DIAGNOSIS — O21.0: Primary | ICD-10-CM

## 2024-06-13 PROCEDURE — 3078F DIAST BP <80 MM HG: CPT | Performed by: ADVANCED PRACTICE MIDWIFE

## 2024-06-13 PROCEDURE — 3074F SYST BP LT 130 MM HG: CPT | Performed by: ADVANCED PRACTICE MIDWIFE

## 2024-06-13 PROCEDURE — 76811 OB US DETAILED SNGL FETUS: CPT | Performed by: OBSTETRICS & GYNECOLOGY

## 2024-06-13 NOTE — PROGRESS NOTES
Marek, , is at 20w3d, here for her return OB visit.  Currently, she is feeling well. Denies 2nd trimester danger signs. Just came from anatomy US and everything looked good with baby. Reports the pain she had last week was in her back at the bottom of her rib cage. It felt like a soreness not a sharp pain. It was gone by the next day, didn't have any for a few days but since then has felt it mildly a few times. Not nearly as bad as it was that first time and seems to happen more at night not related to when she eats.   Has cardiology appointment coming up in July.       Vital signs and weight reviewed  See flowsheets     Assessment/Plan:   Anatomy US reviewed  Discussed that the pain in her back sounds likely musculoskeletal. Pt is starting PT soon, will see if that helps with the pain. Warning signs reviewed and pt instructed to call if she has severe pain again like the first time.    Next visit: 4 weeks    Reviewed:   Prenatal visit schedule  2nd trimester precautions and expectations   labor precautions  Danger signs      I have spent 20 minutes total time on the day of the encounter, including: preparing to see the patient, ordering/reviewing labs, performing a medically appropriate exam, and providing care coordination. face to face counseling, chart review, orders and coordination of care    Pt verbalized understanding. All questions answered. No barriers to learning identified

## 2024-07-11 ENCOUNTER — ROUTINE PRENATAL (OUTPATIENT)
Dept: OBGYN CLINIC | Facility: CLINIC | Age: 24
End: 2024-07-11

## 2024-07-11 VITALS
WEIGHT: 161.81 LBS | SYSTOLIC BLOOD PRESSURE: 106 MMHG | DIASTOLIC BLOOD PRESSURE: 71 MMHG | BODY MASS INDEX: 26 KG/M2 | HEART RATE: 89 BPM

## 2024-07-11 DIAGNOSIS — Z34.92 PRENATAL CARE, SECOND TRIMESTER (HCC): Primary | ICD-10-CM

## 2024-07-11 PROCEDURE — 3078F DIAST BP <80 MM HG: CPT | Performed by: ADVANCED PRACTICE MIDWIFE

## 2024-07-11 PROCEDURE — 3074F SYST BP LT 130 MM HG: CPT | Performed by: ADVANCED PRACTICE MIDWIFE

## 2024-07-11 RX ORDER — ASPIRIN 81 MG/1
81 TABLET ORAL DAILY
COMMUNITY
Start: 2024-03-05

## 2024-07-11 NOTE — PATIENT INSTRUCTIONS
Childbirth Education Resources    HYPNOBIRTHING  [facility] www.[email or web address].[email or web address] [phone]  Dave Perez, [place], Jose Luis Bowser [place], [place]    Hypnobirthing    www.The FeedRoom.Cinario  [phone]    Dinorah Soto/The Birth Zone www.SimPrintsbirthzoWhoJam  [phone]    EVIDENCE BASED BIRTH  AlejandrinaCounts include 234 beds at the [facility]   www.Open Source Storagebirth.Cinario         Nilam Sheets  www.birthwithchristine.[email or web address]  [phone]  SPINNING BABIES  [facility]  www.Open Source Storagebirth.Cinario        [facility] www.The FeedRoom.Cinario  [phone]      [facility]  www.[email or web address]          [address]  www.NovaSparksbirth.Cinario/carolcanada [phone]  [place]  Demetria Frankel  www.NovaSparksbirth.Cinario/carriepotts [phone]  [place]  Becky Danny  www.NovaSparksbirth.Cinario/marissalukas [phone]  [place]  Deborah Coldiron  www.[email or web address]/lisaupham [phone]  Phil Junior  www.birthBedlooar.Cinario  [phone]  PATTIE Anaya  www.mesabirt.net   [phone]  [place]  INFORMED BEGINNINGS  [facility]  www.angiAtrium Health Providence[facility].Cinario   Ivanna Morgan/[facility]omes[facility] Birth www.homespunbirth.Cinario  [phone]  [facility]/private classes  throughout [facility]     Tamra Lewis  [facility] www.Playlore  [phone]  [place]     [place]  The [employer] Birth Good Start Genetics   https://theposEnventumbirthMoxie.co.[email or web address]/digital-pack  Mama Natural  www.mamanaturalbirth.com     OTHERS  Teen Parent Connection www.teenparentconnection.org     Free classes for 23yo & under  Shanae Randhawa   www.AvanSci Bio  [phone]  [facility]  Jeimy Magana  www.[email or web address]/classes    [facility]'s   www.[facility].org/services/pregnancy-baby/resources     Childbirth Express    Pregnancy Birth and Beyond www.The FeedRoom.Cinario  [phone]    [facility] Dojonathan     Intro to Natural  Childbirth www.Cont3nt.com  [phone]    [facility]   BABY CARE and BREASTFEEDING  [facility] (Breastfeeding) www.[employer]  [phone]  ROSA Abreu    Breastfeeding  www.Cont3nt.com  [phone]    with Lauren Beckham    Bringing home baby  www.Cont3nt.com  [phone]    with Libby Santos    [facility]  www.[facility].org/services/pregnancy-baby/resources     Breastfeeding class    Virtual Babycare Class  www.[facility].org/services/pregnancy-baby/resources  For [facility]

## 2024-07-11 NOTE — PROGRESS NOTES
Feeling well. Endorses regular fetal movement. Denies contractions, LOF, vaginal bleeding.     1hr GTT and CBC ordered. Instructed patient to complete between 26-28 weeks    Plan:  SILVIO 4 weeks    Reviewed second trimester warning signs and when to call.

## 2024-07-29 ENCOUNTER — TELEPHONE (OUTPATIENT)
Dept: OBGYN CLINIC | Facility: CLINIC | Age: 24
End: 2024-07-29

## 2024-07-29 ENCOUNTER — LAB ENCOUNTER (OUTPATIENT)
Dept: LAB | Facility: HOSPITAL | Age: 24
End: 2024-07-29
Attending: ADVANCED PRACTICE MIDWIFE
Payer: COMMERCIAL

## 2024-07-29 DIAGNOSIS — Z34.92 PRENATAL CARE, SECOND TRIMESTER (HCC): ICD-10-CM

## 2024-07-29 DIAGNOSIS — R73.09 ELEVATED GLUCOSE TOLERANCE TEST: Primary | ICD-10-CM

## 2024-07-29 LAB
DEPRECATED RDW RBC AUTO: 42.4 FL (ref 35.1–46.3)
ERYTHROCYTE [DISTWIDTH] IN BLOOD BY AUTOMATED COUNT: 12.9 % (ref 11–15)
GLUCOSE 1H P GLC SERPL-MCNC: 140 MG/DL
HCT VFR BLD AUTO: 33.3 %
HGB BLD-MCNC: 11.9 G/DL
MCH RBC QN AUTO: 32.5 PG (ref 26–34)
MCHC RBC AUTO-ENTMCNC: 35.7 G/DL (ref 31–37)
MCV RBC AUTO: 91 FL
PLATELET # BLD AUTO: 310 10(3)UL (ref 150–450)
RBC # BLD AUTO: 3.66 X10(6)UL
WBC # BLD AUTO: 8.7 X10(3) UL (ref 4–11)

## 2024-07-29 PROCEDURE — 82950 GLUCOSE TEST: CPT | Performed by: ADVANCED PRACTICE MIDWIFE

## 2024-07-29 PROCEDURE — 85027 COMPLETE CBC AUTOMATED: CPT | Performed by: ADVANCED PRACTICE MIDWIFE

## 2024-07-29 NOTE — TELEPHONE ENCOUNTER
LMTCB      Lottie Tijerina CNM  7/29/2024 11:44 AM CDT Back to Top      Please call patient. 1hr GTT elevated. She needs 3hr. Thanks!

## 2024-07-30 NOTE — TELEPHONE ENCOUNTER
Pt called and informed of results and provider recommendations. Pt voices understanding. Contact information for centralized scheduling sent to pt per [employer].

## 2024-08-05 ENCOUNTER — LABORATORY ENCOUNTER (OUTPATIENT)
Dept: LAB | Facility: HOSPITAL | Age: 24
End: 2024-08-05
Attending: ADVANCED PRACTICE MIDWIFE
Payer: COMMERCIAL

## 2024-08-05 DIAGNOSIS — R73.09 ELEVATED GLUCOSE TOLERANCE TEST: ICD-10-CM

## 2024-08-05 LAB
GLUCOSE 1H P GLC SERPL-MCNC: 178 MG/DL
GLUCOSE 2H P GLC SERPL-MCNC: 158 MG/DL
GLUCOSE 3H P GLC SERPL-MCNC: 97 MG/DL (ref 70–140)
GLUCOSE P FAST SERPL-MCNC: 81 MG/DL

## 2024-08-05 PROCEDURE — 82951 GLUCOSE TOLERANCE TEST (GTT): CPT | Performed by: ADVANCED PRACTICE MIDWIFE

## 2024-08-05 PROCEDURE — 82952 GTT-ADDED SAMPLES: CPT | Performed by: ADVANCED PRACTICE MIDWIFE

## 2024-08-06 ENCOUNTER — ROUTINE PRENATAL (OUTPATIENT)
Dept: OBGYN CLINIC | Facility: CLINIC | Age: 24
End: 2024-08-06

## 2024-08-06 VITALS
BODY MASS INDEX: 27 KG/M2 | SYSTOLIC BLOOD PRESSURE: 115 MMHG | WEIGHT: 169 LBS | DIASTOLIC BLOOD PRESSURE: 76 MMHG | HEART RATE: 87 BPM

## 2024-08-06 DIAGNOSIS — Z34.93 PRENATAL CARE IN THIRD TRIMESTER (HCC): Primary | ICD-10-CM

## 2024-08-06 PROCEDURE — 3078F DIAST BP <80 MM HG: CPT | Performed by: ADVANCED PRACTICE MIDWIFE

## 2024-08-06 PROCEDURE — 3074F SYST BP LT 130 MM HG: CPT | Performed by: ADVANCED PRACTICE MIDWIFE

## 2024-08-06 NOTE — PATIENT INSTRUCTIONS
Kick Counts  It’s normal to worry about your baby’s health. One way you can know your baby’s doing well is to record the baby’s movements once a day. This is called a kick count.   Remember to take your kick count records to all your appointments with your healthcare provider.  How to count kicks    Time how long it takes you to feel 10 kicks, flutters, swishes, or rolls. Ideally, you want to feel at least 10 movements in 2 hours. You will likely feel 10 movements in less time than that.  Starting at 28 weeks, count your baby's movements daily. Follow your healthcare provider's instructions for kick counting. Here are tips for counting kicks:  Choose a time when the baby is active, such as after a meal.   Sit comfortably or lie on your side.   The first time the baby moves, write down the time.   Count each movement until the baby has moved  10 times. This can take from 20 minutes to 2 hours.   If you haven't felt 10 kicks by the end of the second hour, wait a few hours. Then try again.  Try to do it at the same time each day.  When to call your healthcare provider  Call your healthcare provider  right away if:  You do a couple sets of kick counts during the day and your baby moves fewer than 10 times in 2 hours.  Your baby moves much less often than on the days before.  You haven't felt your baby move all day.  saambaa last reviewed this educational content on 2020    © 3666-5128 [employer]. All rights reserved. This information is not intended as a substitute for professional medical care. Always follow your healthcare professional's instructions. Premature Labor    Premature labor ( labor) is when symptoms of labor occur before 37 weeks of pregnancy. (This is 3 weeks before your due date.) Premature labor can lead to premature delivery. This means giving birth to your baby early. Babies need at least 37 weeks of pregnancy for all the organs to develop normally. The earlier the  delivery, the greater the risks to the baby.  In most cases, the cause of premature labor is unknown. But certain factors may make the problem more likely. These include:  History of premature labor with other pregnancies  Smoking  Alcohol or substance abuse  Low pre-pregnancy weight or weight gain during pregnancy  Short time period between pregnancies  Being pregnant with twins, triplets, or more  History of certain types of surgery on the cervix or uterus  Having a short cervix  Certain infections  There are a number of other risk factors. Ask your healthcare provider to help you understand the risk factors specific to your case. Then find out what you can do to control or reduce them.  Contractions are one of the main signs of premature labor. A contraction is different from cramping. It may feel painful and the belly (abdomen) may get hard. It can last from a few seconds to a few minutes. Some women may feel only a sense of pressure in the belly, thighs, rectum, or vagina. Some may feel only the hardening of the uterus without pain or pressure. Or there may be a constant pain in the lower back, which spreads forward toward the belly.Premature labor is often treated with medicines. A hospital stay may be needed. If labor doesn't progress and you and your baby are both healthy, you may be discharged to continue care at home.  Home care  Ask your provider any questions you have. Be certain you understand how to care for yourself at home. Also follow all recommendations given by your healthcare providers.  Learn the signs of premature labor. Watch for these signs when you get home.  Limit or restrict activities as advised. This may include stopping certain physical activities and cutting back hours at work.  Avoid doing strenuous work as directed by your provider. Ask family and friends for help with tasks and support at home, if needed.  Don’t smoke, drink alcohol, or use other harmful substances.  Take steps to  reduce stress.  Report any unusual symptoms to your provider.    Follow-up care  Follow up with your healthcare provider, or as directed. Weekly visits with your provider may be needed.  When to seek medical advice  Call your healthcare provider right away if any of these occur:  Regular or frequent contractions, whether they are painful or not  Pressure in the pelvis  Pressure in the lower belly or mild cramping in your belly with or without diarrhea  Constant low, dull backache  Gush or slow leaking of water from your vagina  Change in vaginal discharge (watery, mucus, or bloody)  Any vaginal bleeding  Decreased movement of your baby  Brittnee last reviewed this educational content on 2018 [employer]. All rights reserved. This information is not intended as a substitute for professional medical care. Always follow your healthcare professional's instructions.     Understanding Preeclampsia  Preeclampsia is a condition that can happen in pregnancy. It includes high blood pressure (hypertension), swelling, and signs of organ problems. It can show up around week 20 of pregnancy. It often goes away by 12 weeks after you give birth. It can lead to serious health risks for you and your baby. During your pregnancy, your healthcare provider will watch your blood pressure.      Your blood pressure will be monitored regularly throughout your pregnancy to help check for preeclampsia.     Dangers of preeclampsia   If not treated, preeclampsia can cause problems for you and your baby. The placenta is the organ that nourishes your baby. It may tear away from the wall of the uterus. This can put the baby at risk for health problems (fetal distress). It can put the baby at risk for  birth. Preeclampsia can also cause these health problems in you:   Kidney failure or other organ damage  Seizures  Stroke  Who’s at risk for preeclampsia?   No one knows what causes preeclampsia. It can happen  in any pregnant person. But there are things that increase your risk. You may need to take a daily low dose of aspirin if you are at risk for preeclampsia.   You’re at higher risk for preeclampsia if you have any of these:  Diabetes  High blood pressure  Obesity  Kidney disease  Autoimmune disease such as lupus  A family history of preeclampsia  You’re at higher risk if any of these apply to you:  This is your first pregnancy  You are having twins or more  You’re under age 18 or over age 40  You used in vitro fertilization  You are Black  And you’re at higher risk if you had any of these in a past pregnancy:   Preeclampsia  Intrauterine growth retardation (IUGR)   birth  Placental abruption  Fetal death  Symptoms  A common symptom of preeclampsia is high blood pressure. Other symptoms may include:   Fast weight gain  Protein in your urine  Headache  Belly (abdominal) pain on your right side  Vision problems such as flashes or spots  Swelling (edema) in your face or hands (this often happens near the end of a normal pregnancy)  Tests you may have   Your healthcare provider will want to check your blood pressure. This will need to be done often in your pregnancy. If your blood pressure is high, you may have these tests:   Urine tests to look for protein  Blood tests to confirm preeclampsia  Fetal monitoring to make sure that your baby is healthy  Treating preeclampsia   Preeclampsia almost always ends soon after you give birth. Until then, your healthcare provider can help you manage it.   If your symptoms are mild, you may to:     Limit your activity  Rest in bed  Not do heavy lifting    If your symptoms are severe, you will stay in the hospital. Treatment here may include:   Limits to your activity. This is to help control your blood pressure. You should not lift anything heavy. You will need to spend 8 hours a day lying down with your feet up.  Magnesium IV (intravenous) drip. This is done during labor. It's  to prevent seizures  Induced labor or  section. Birth of the baby is considered the cure for preeclampsia.  When to call your healthcare provider   Call your healthcare provider if your symptoms start quickly or are severe. This includes swelling, weight gain, or other symptoms. Some cases of preeclampsia are more severe than others. Your symptoms also may change or get worse as you get closer to your due date.   Once you give birth   In most cases, preeclampsia goes away on its own soon after you give birth. This is often by the 12th week after you deliver. Within days after you give birth, your blood pressure, swelling, and other symptoms should get better. But for some people, problems from preeclampsia can continue after birth.   Postpartum preeclampsia   Preeclampsia that starts after birth is rare. There are 2 types:   Postpartum preeclampsia. This may start in the first 48 hours after birth.  Late-onset preeclampsia. This starts more than 48 hours after birth.  Both of these types are rare. But call your healthcare provider right away if you have symptoms of preeclampsia after you give birth.   Brittnee last reviewed this educational content on 10/1/2021    © 3330-8901 [employer]. All rights reserved. This information is not intended as a substitute for professional medical care. Always follow your healthcare professional's instructions.

## 2024-08-06 NOTE — PROGRESS NOTES
Here for SILVIO visit.      Patient's last menstrual period was 2024 (exact date). 10/28/2024, Date entered prior to episode creation 28w1d     Baby active. Denies contractions, LOF or bleeding    Labs: Passed 3hr. HIV & trep ordered  Ultrasound:  Watch FH at [facility]. Has growth ultrasound scheduled for 32 wks  Tdap: Discussed recommendation. She will consider    Saw Neuro- had normal Echo and told not vascular type of hypermobility disorder. Plans eval for possible POTS after pregnancy. Has not had symptoms during pregnancy.      Fetal kick counts, warning signs and signs PTL reviewed.    28 wk packet given and reviewed

## 2024-08-19 PROBLEM — O99.810 GLUCOSE INTOLERANCE OF PREGNANCY (HCC): Status: ACTIVE | Noted: 2024-08-19

## 2024-08-19 NOTE — PROGRESS NOTES
Marek Odonnell is a 24 year old  pt at 30w1d here for SILVIO  Did not go to Integrative medicine/acupuncture  Has not been taking baby ASA    ROS:  Denies cramping, bleeding, leaking of fluid.  Fetus is active.    Vitals:    24 1356   BP: 113/75   Pulse: 74   Weight: 173 lb (78.5 kg)      See flowsheet  TWG: -12 lbs gained 4 lbs since last visit  1 hr 140, 3 hr 1 elevated value    Assessment/Plan:  IUP at 30w1d  1. Prenatal care in third trimester (HCC)    2. Fibromyalgia    3. Generalized hypermobility of joints       Reviewed:  Recommendations and rationale for Tdap vaccine(s) in pregnancy- Pt still considering  /Labor precautions  Saw Neuro- had normal Echo and told not vascular type of hypermobility disorder. Plans eval for possible POTS (postural orthostatic tachcardia syndrom)after pregnancy. Has not had symptoms during pregnancy.   Kick counts  Danger Signs/PreE s/s  32 wk growth US 24  Trep/HIV today  Weekly NSTs at 36 wks  RTC 2 wk(s)    I have spent 20 minutes total time on the day of the encounter, including: preparing to see the patient, ordering/reviewing labs, performing a medically appropriate exam, and providing care coordination. face to face counseling, chart review, orders and coordination of care     Pt verbalized understanding.  All questions answered.  No barriers to learning identified

## 2024-08-20 ENCOUNTER — ROUTINE PRENATAL (OUTPATIENT)
Dept: OBGYN CLINIC | Facility: CLINIC | Age: 24
End: 2024-08-20

## 2024-08-20 ENCOUNTER — LAB ENCOUNTER (OUTPATIENT)
Dept: LAB | Facility: HOSPITAL | Age: 24
End: 2024-08-20
Attending: ADVANCED PRACTICE MIDWIFE
Payer: COMMERCIAL

## 2024-08-20 VITALS
BODY MASS INDEX: 28 KG/M2 | WEIGHT: 173 LBS | HEART RATE: 74 BPM | SYSTOLIC BLOOD PRESSURE: 113 MMHG | DIASTOLIC BLOOD PRESSURE: 75 MMHG

## 2024-08-20 DIAGNOSIS — Z34.93 PRENATAL CARE IN THIRD TRIMESTER (HCC): ICD-10-CM

## 2024-08-20 DIAGNOSIS — M24.80 GENERALIZED HYPERMOBILITY OF JOINTS: ICD-10-CM

## 2024-08-20 DIAGNOSIS — M79.7 FIBROMYALGIA: ICD-10-CM

## 2024-08-20 DIAGNOSIS — Z34.93 PRENATAL CARE IN THIRD TRIMESTER (HCC): Primary | ICD-10-CM

## 2024-08-20 LAB — T PALLIDUM AB SER QL IA: NONREACTIVE

## 2024-08-20 PROCEDURE — 87389 HIV-1 AG W/HIV-1&-2 AB AG IA: CPT | Performed by: ADVANCED PRACTICE MIDWIFE

## 2024-08-20 PROCEDURE — 86780 TREPONEMA PALLIDUM: CPT | Performed by: ADVANCED PRACTICE MIDWIFE

## 2024-08-20 PROCEDURE — 3074F SYST BP LT 130 MM HG: CPT | Performed by: ADVANCED PRACTICE MIDWIFE

## 2024-08-20 PROCEDURE — 3078F DIAST BP <80 MM HG: CPT | Performed by: ADVANCED PRACTICE MIDWIFE

## 2024-09-04 ENCOUNTER — ROUTINE PRENATAL (OUTPATIENT)
Dept: OBGYN CLINIC | Facility: CLINIC | Age: 24
End: 2024-09-04

## 2024-09-04 VITALS
WEIGHT: 178 LBS | HEART RATE: 89 BPM | DIASTOLIC BLOOD PRESSURE: 78 MMHG | SYSTOLIC BLOOD PRESSURE: 114 MMHG | BODY MASS INDEX: 29 KG/M2

## 2024-09-04 DIAGNOSIS — Z34.03 PRENATAL CARE, FIRST PREGNANCY IN THIRD TRIMESTER (HCC): Primary | ICD-10-CM

## 2024-09-04 PROBLEM — O99.810 GLUCOSE INTOLERANCE OF PREGNANCY (HCC): Status: RESOLVED | Noted: 2024-08-19 | Resolved: 2024-09-04

## 2024-09-04 PROBLEM — R00.2 HEART PALPITATIONS: Status: ACTIVE | Noted: 2021-01-13

## 2024-09-04 PROBLEM — K21.9 GASTROESOPHAGEAL REFLUX DISEASE WITHOUT ESOPHAGITIS: Status: ACTIVE | Noted: 2023-02-15

## 2024-09-04 PROCEDURE — 3078F DIAST BP <80 MM HG: CPT | Performed by: ADVANCED PRACTICE MIDWIFE

## 2024-09-04 PROCEDURE — 3074F SYST BP LT 130 MM HG: CPT | Performed by: ADVANCED PRACTICE MIDWIFE

## 2024-09-04 NOTE — PATIENT INSTRUCTIONS
Adapting to Pregnancy: Third Trimester    Although common during pregnancy, some discomforts may seem worse in the final weeks. Simple lifestyle changes can help. Take care of yourself. And ask your partner to help out with small tasks.  Limiting leg problems  Ways to combat leg issues:  Wear support hose all day.  Avoid snug shoes and clothes that bind, like tight pants and socks with elastic tops.  Sit with your feet and legs raised often.  Caring for your breasts  Tips to follow include:  Wash with plain water. Avoid using harsh soaps or rubbing alcohol. They may cause dryness.  Wear a nursing bra for extra support. It can also hide any leaks from your nipples.  Controlling hemorrhoids  Ways to avoid hemorrhoids include:  Eat foods that are high in fiber. Also, exercise and drink enough fluids. This will reduce constipation and hemorrhoids.  Sleep and nap on your side. This limits pressure on the veins of your rectum.  Try not to stand or sit for long periods.  Controlling back pain  As your body changes during pregnancy, your back must work in new ways. Back pain is due to many causes. Physical changes in your body can strain your back and its supporting muscles. Also, hormones (chemicals that carry messages throughout the body) increase during pregnancy. This can affect how your muscles and joints work together. All of these changes can lead to pain. Pain may be felt in the upper or lower back. Pain is also common in the pelvis. Some pregnant women have sciatica. This is pain caused by pressure on the sciatic nerve running down the back of the leg. Ask your healthcare provider for specific tips and exercises to help control your back pain.  Tips to help you rest  Good rest and sleep will help you feel better. Here are some ideas:  Ask your partner to massage your shoulders, neck, or back.  Limit the errands you do each day.  Lie down in the afternoon or after work for a few minutes.  Take a warm bath before  you go to sleep.  Drink warm milk or teas without caffeine.  Avoid coffee, black tea, and cola.  Stopping heartburn  Avoid spicy, greasy, fried, or acidic foods.  Eat small amounts more often. Eat slowly. Wait 2 hours after eating before lying down.  Sleep with your upper body raised 6 inches.   Managing mood swings  Ways to manage mood swings include:  Know that mood changes are normal.  Exercise often, but get plenty of rest.  Address any concerns and limit stress. Talking to your partner, other women, or your healthcare provider may help.  Dealing with urinary frequency  Tips to deal with having to urinate often include:  Drink plenty of water all day. If you drink a lot in the evening, though, you may have to get up more in the night.  Limit coffee, black tea, and cola.  [employer] last reviewed this educational content on 2/1/2018 © 2000-2020 [employer]. [address] [place] [address]. All rights reserved. This information is not intended as a substitute for professional medical care. Always follow your healthcare professional's instructions.        Pregnancy: Your Third Trimester Changes  As the baby grows, your body changes too. You may also see signs that your body is getting ready for labor. Be patient. Within a few more weeks, your baby will be born.  How you are changing  Your body is preparing for the birth of your baby. Some of the most common changes are listed below. If you have any questions or concerns, ask your healthcare provider:  You’ll gain more weight from fluids, extra blood, and fat deposits.  Your breasts will grow as your body gets ready to feed the baby. They may be more tender. You may also notice a slight yellow or white discharge from the nipples.  Discharge from your vagina may increase. This is normal.  You might see some skin color changes on your forehead, cheeks, or nose. Most of these will go away after you deliver.  How your baby is growing       Month  7  Your baby can open and close his or her eyes and weighs around 4 pounds. If born prematurely (too early), your baby would likely survive with special care. Month 8  Your baby is building up body fat and weighs around 6 pounds. Month 9  Your baby weighs nearly 7 pounds and is about 19 to 21 inches long. In other words, any day now...   [employer] last reviewed this educational content on 2018 The [employer]. [address] [place] [address], [place] [address]. All rights reserved. This information is not intended as a substitute for professional medical care. Always follow your healthcare professional's instructions.   Understanding  Labor  Going into labor before your 37th week of pregnancy is called  labor.  labor can cause your baby to be born too soon. This can lead to a number of health problems that may affect your baby.      Before labor, the cervix is thick and closed.      In  labor, the cervix begins to efface (thin) and dilate (open).   Symptoms of  labor   If you think you’re having  labor, get medical help right away. Contractions alone don’t mean you’re in  labor. What matters more are changes in your cervix (the lower end of the uterus). Symptoms of  labor include:   Four or more contractions per hour  Strong contractions  Constant menstrual-like cramping  Low-back pain  Mucous or bloody vaginal discharge  Bleeding or spotting in the second or third trimester  Evaluating  labor   Your healthcare provider will try to find out whether you’re in  labor or whether you’re just having contractions. He or she may watch you for a few hours. The following tests may be done:   Pelvic exam to see if your cervix has effaced (thinned) and dilated (opened)  Uterine activity monitoring to detect contractions  Fetal monitoring to check the health of your baby  Ultrasound to check your baby’s size and position  Amniocentesis to  check how mature your baby’s lungs are  Caring for yourself at home   If you have  contractions, but your cervix is still thick and closed, your healthcare provider may ask you to do the following at home:   Drink plenty of water.  Do fewer activities.  Rest in bed on your side.  Don't have intercourse or nipple stimulation.  When to call your healthcare provider   Call your healthcare provider if you notice any of these:   Four or more contractions per hour  Bag of water breaks  Bleeding or spotting  If you need hospital care    labor often requires that you have hospital care and complete bed rest. You may have an IV (intravenous) line to get fluids. You may be given pills or injections to help prevent contractions. Finally, you may get medicine (corticosteroids) that helps your baby’s lungs mature more quickly.   Are you at risk?   Any pregnant woman can have  labor. It may start for no reason. But these risk factors can increase your chances:   Past  labor or past early birth  Smoking, drug, or alcohol use during pregnancy  Multiple fetuses (twins or more)  Problems with the shape of the uterus  Bleeding during the pregnancy  The dangers of  birth   A baby born too soon may have health problems. This is because the baby didn’t have enough time to mature. Some of the risks for your baby include:   Not breastfeeding or feeding well  Having immature lungs  Bleeding in the brain  Dying  Reaching term   Your goal is to get as close to term as you can before giving birth. The closer you get to term, the higher your chance of having a healthy baby. Work with your healthcare provider. Together, you can take steps that may keep you from giving birth too early.   [employer] last reviewed this educational content on 3/1/2019  © 7279-6237 The [employer], [employer]. [address] [place] [address], [place] [address]. All rights reserved. This information is not intended as a substitute for  professional medical care. Always follow your healthcare professional's instructions.        Premature Labor    Premature labor ( labor) is when symptoms of labor occur before 37 weeks of pregnancy. (This is 3 weeks before your due date.) Premature labor can lead to premature delivery. This means giving birth to your baby early. Babies need at least 37 weeks of pregnancy for all the organs to develop normally. The earlier the delivery, the greater the risks to the baby.  In most cases, the cause of premature labor is unknown. But certain factors may make the problem more likely. These include:  History of premature labor with other pregnancies  Smoking  Alcohol or substance abuse  Low pre-pregnancy weight or weight gain during pregnancy  Short time period between pregnancies  Being pregnant with twins, triplets, or more  History of certain types of surgery on the cervix or uterus  Having a short cervix  Certain infections  There are a number of other risk factors. Ask your healthcare provider to help you understand the risk factors specific to your case. Then find out what you can do to control or reduce them.  Contractions are one of the main signs of premature labor. A contraction is different from cramping. It may feel painful and the belly (abdomen) may get hard. It can last from a few seconds to a few minutes. Some women may feel only a sense of pressure in the belly, thighs, rectum, or vagina. Some may feel only the hardening of the uterus without pain or pressure. Or there may be a constant pain in the lower back, which spreads forward toward the belly.Premature labor is often treated with medicines. A hospital stay may be needed. If labor doesn't progress and you and your baby are both healthy, you may be discharged to continue care at home.  Home care  Ask your provider any questions you have. Be certain you understand how to care for yourself at home. Also follow all recommendations given by your  healthcare providers.  Learn the signs of premature labor. Watch for these signs when you get home.  Limit or restrict activities as advised. This may include stopping certain physical activities and cutting back hours at work.  Avoid doing strenuous work as directed by your provider. Ask family and friends for help with tasks and support at home, if needed.  Don’t smoke, drink alcohol, or use other harmful substances.  Take steps to reduce stress.  Report any unusual symptoms to your provider.    Follow-up care  Follow up with your healthcare provider, or as directed. Weekly visits with your provider may be needed.  When to seek medical advice  Call your healthcare provider right away if any of these occur:  Regular or frequent contractions, whether they are painful or not  Pressure in the pelvis  Pressure in the lower belly or mild cramping in your belly with or without diarrhea  Constant low, dull backache  Gush or slow leaking of water from your vagina  Change in vaginal discharge (watery, mucus, or bloody)  Any vaginal bleeding  Decreased movement of your baby  [employer] last reviewed this educational content on 6/1/2018 © 2000-2020 The [employer]. [address] [place], [place] [id number]. All rights reserved. This information is not intended as a substitute for professional medical care. Always follow your healthcare professional's instructions.        Understanding Preeclampsia  Preeclampsia is high blood pressure (hypertension) that happens during pregnancy. It often shows up around the 20th week of pregnancy. It often goes back to normal by the 12th week after you give birth. It can lead to serious health risks for you and your baby. During your pregnancy, your healthcare provider will watch your blood pressure.    Symptoms  A common symptom of preeclampsia is high blood pressure. Other symptoms may include:  Rapid weight gain  Protein in your urine  Headache  Belly (abdominal) pain on your  right side  Vision problems. These include flashes or spots.  Swelling (edema) in your face or hands. This also often happens near the end of normal pregnancies, even without preeclampsia.  Tests you may have  Your healthcare provider will want to check your blood pressure throughout your pregnancy. If your blood pressure is high, you may have the following tests:  Urine tests to look for protein  Blood tests to confirm preeclampsia  Fetal monitoring to make sure that your baby is healthy  Treating preeclampsia  You may need to take a daily low dose of aspirin if you are at risk for preeclampsia. Preeclampsia almost always ends soon after you give birth. Until then, your healthcare provider can help manage your condition. If your symptoms are mild, you may need activity limits at home, including bed rest and no heavy lifting. If your symptoms are severe, you will stay in the hospital. Hospital treatment includes:  Activity limits to help control blood pressure. This means no heavy lifting and 8 hours per day lying down with the feet up.  Magnesium IV (intravenous) drip during labor to prevent seizures  Induced labor or surgical delivery by  section. Delivery is considered the cure for preeclampsia.  When to call your healthcare provider  Call your healthcare provider if swelling, weight gain, or other symptoms come on quickly or are severe. Some cases of preeclampsia are more severe than others. Your symptoms also may change or get worse as you get closer to your due date.  Who’s at risk?  No one knows what causes preeclampsia. Preeclampsia can happen in any pregnant woman. But it is more common in first-time pregnancies. Things that increase the risk include:  Previous pregnancies. You are at risk if you had preeclampsia, intrauterine growth retardation (IUGR),  birth, placental abruption, or fetal death in a past pregnancy.  Health history of mother. You are at risk if you have diabetes, high blood  pressure, obesity, kidney disease, autoimmune disease such as lupus, or a family history of preeclampsia.  Current pregnancy. You are at risk if this is your first pregnancy, or if you have multiple fetuses, are younger than age 18 or older than 40, or used in vitro fertilization.  Race. You are at risk if you are black.  Dangers of preeclampsia  If not treated, preeclampsia can cause problems for you and your baby. The placenta is the organ that nourishes your baby. It may tear away from the uterine wall. This can put the baby at risk for health problems (fetal distress) and premature birth. Preeclampsia can also cause these health problems:  Kidney failure or other organ damage  Seizures  Stroke  Once you give birth  In most cases, preeclampsia goes away on its own soon after you give birth. This is often by the 12th week after you give deliver. Within days of delivery, your blood pressure, swelling, and other symptoms should get better. For some women, problems from preeclampsia can continue after delivery.  Postpartum preeclampsia that develops within the first 48 hours after delivery is rare. Another type of postpartum preeclampsia that develops more than 48 hours after delivery is called late-onset preeclampsia. It is also rare. Contact your healthcare provider right away if you have symptoms of preeclampsia after you deliver.  [employer] last reviewed this educational content on 12/1/2019  © 2565-9755 [employer], [employer]. [address] [place] [address], [place] [address]. All rights reserved. This information is not intended as a substitute for professional medical care. Always follow your healthcare professional's instructions.        Kick Counts    It’s normal to worry about your baby’s health. One way you can know your baby’s doing well is to record the baby’s movements once a day. This is called a kick count. Remember to take your kick count records to all your appointments with your healthcare provider.  How  to count kicks  Time how long it takes you to feel 10 kicks, flutters, swishes, or rolls. Ideally, you want to feel at least 10 movements within 2 hours. You will likely feel 10 movements in less time than that.  Starting at 28 weeks, count your baby's movements daily. Follow your healthcare provider's instructions for kick counting. Here are tips for counting kicks:  Choose a time when the baby is active, such as after a meal.   Sit comfortably or lie on your side.   The first time the baby moves, write down the time.   Count each movement until the baby has moved 10 times. This can take from 20 minutes to 2 hours.   If you have not felt 10 kicks by the end of the second hour, wait a few hours. Then try again.  Try to do it at the same time each day.  When to call your healthcare provider  Call your healthcare provider right away if:  You do a couple sets of kick counts during the day and your baby moves fewer than 10 times in 2 hours  Your baby moves much less often than on the days before.  You have not felt your baby move all day.  "[employer] last reviewed this educational content on 12/1/2017  © 3126-5962 The [employer], [employer]. [address] [place] [address], [place] [address]. All rights reserved. This information is not intended as a substitute for professional medical care. Always follow your healthcare professional's instructions.

## 2024-09-04 NOTE — PROGRESS NOTES
Marek, , is at 32w2d, here for her SILVIO visit.  Currently, she is feeling well. Denies 3rd trimester danger signs. Declines Tdap today.    Vital signs and weight reviewed  See flowsheets    Assessment/Plan: Tdap recommended but declined  Next visit: 2 weeks    Reviewed:   Prenatal visit schedule  Recommendations and rationale for Tdap vaccine(s) in pregnancy   labor precautions  Kick counts    Pt verbalized understanding. All questions answered. No barriers to learning identified

## 2024-09-05 ENCOUNTER — HOSPITAL ENCOUNTER (OUTPATIENT)
Dept: PERINATAL CARE | Facility: HOSPITAL | Age: 24
Discharge: HOME | End: 2024-09-05
Attending: OBSTETRICS & GYNECOLOGY
Payer: COMMERCIAL

## 2024-09-05 VITALS
DIASTOLIC BLOOD PRESSURE: 66 MMHG | BODY MASS INDEX: 29 KG/M2 | HEART RATE: 75 BPM | SYSTOLIC BLOOD PRESSURE: 109 MMHG | WEIGHT: 178 LBS

## 2024-09-05 DIAGNOSIS — M24.80 GENERALIZED HYPERMOBILITY OF JOINTS: ICD-10-CM

## 2024-09-05 DIAGNOSIS — O99.213 OBESITY AFFECTING PREGNANCY IN THIRD TRIMESTER, UNSPECIFIED OBESITY TYPE (HCC): ICD-10-CM

## 2024-09-05 DIAGNOSIS — O21.9 NAUSEA AND VOMITING IN PREGNANCY (HCC): Primary | ICD-10-CM

## 2024-09-05 DIAGNOSIS — O21.9 NAUSEA AND VOMITING IN PREGNANCY (HCC): ICD-10-CM

## 2024-09-05 PROCEDURE — 76816 OB US FOLLOW-UP PER FETUS: CPT | Performed by: OBSTETRICS & GYNECOLOGY

## 2024-09-05 PROCEDURE — 76819 FETAL BIOPHYS PROFIL W/O NST: CPT

## 2024-09-05 NOTE — PROGRESS NOTES
Outpatient Maternal-Fetal Medicine Follow-Up     Dear Ms. Germánzamzam,     Thank you for requesting ultrasound evaluation and maternal fetal medicine consultation on your patient Marek Odonnell.  As you are aware she is a 24 year old female  with a Wynn pregnancy and an Estimated Date of Delivery: 10/28/24.  She returned to maternal-fetal medicine today for a follow-up visit.  Her history was reviewed from her prior visit and there were no interval changes.     Antepartum Risk Factors  Hypermobility  - evaluated by Dr. Maggie Hutton at [facility]; PT and echocardiogram: from the note:  \"does not suggest a more wide spread connective tissue disorder, such as hypermobile or vascular EDS, but will get an echocardiogram to evaluate further.\"  Hyperemesis, improved  Prepregnancy BMI 30    S: She reports good fetal movement.  She is to do better with her eating and drinking.  She is nearly back to her baseline weight.  She denies any contractions or leaking of fluid.    PHYSICAL EXAMINATION:  /66   Pulse 75   Wt 178 lb (80.7 kg)   LMP 2024 (Exact Date)   BMI 28.73 kg/m²   General: alert and oriented, no acute distress  Abdomen: gravid, soft, non-tender  Extremities: non-tender, no edema     OBSTETRIC ULTRASOUND  The patient had a level 2 ultrasound today which I interpreted the results and reviewed them with the patient.    Ultrasound Findings:  Single IUP in cephalic presentation.    Placenta is posterior.   A 3 vessel cord is noted.  Cardiac activity is present at 146 bpm  EFW 1820 g ( 4 lb 0 oz); 35%.    KATHIE is  16.9 cm.  MVP is 7.1 cm  BPP is 8/8.     The fetal measurements are consistent with established EDC. No gross ultrasound evidence of structural abnormalities are seen today. The patient understands that ultrasound cannot rule out all structural and chromosomal abnormalities.     See imaging tab for the complete US report.     DISCUSSION  During her visit we discussed and reviewed the  following issues:  Non-invasive Pregnancy Testing (NIPT)  I reviewed her low risk results and the limitations of the screen.        Hyperemesis gravidarum    Hyperemesis is improving.  She has been able to come off the Zofran as of late.  See [facility] note from 1824 for detailed review.     OBESITY:  Her BMI prior to pregnancy was 30  Obesity during pregnancy is associated with numerous maternal and  risks which were discussed previously and reviewed. See [facility] note from 1824 for detailed review.     Hypermobility spectrum disorder not otherwise categorized -this diagnosis is made by rheumatologist 2 to 3 years ago.  She is going to be seeing a specialist at [facility] on May 7, 2024.  She is not certain if this is  María Elena-Danlos  syndrome or not.  There are no family members with these symptoms.     We reviewed if she is diagnosed with María Elena-Danlos syndrome, she should have an echocardiogram to make sure there are no cardiac manifestations or vascular manifestations of the condition.  We discussed that if someone has the hypermobility form, the likelihood of aortic involvement is very low.  I reviewed with her that the neurologist she met with at [facility] also advised that she have an echocardiogram     We discussed the recommended plan of care based on her  risk factors.  Marek and her significant other, Mandeep, had their questions answered to their satisfaction.        IMPRESSION:  IUP at 32w3d  Normal fetal growth and reassuring  testing  Hyperemesis gravidarum, improved  High BMI  Hypermobility spectrum disorder - hypermobile  EDS    RECOMMENDATIONS:  Continue care with Ms. Alvarez  Weekly NST at 36 weeks  Total gestational weight gain should be less than 20 pounds from baseline       Total time spent 20 minutes this calendar day which includes preparing to see the patient including chart review, obtaining and/or reviewing additional medical history, performing a physical exam and evaluation,  documenting clinical information in the electronic medical record, independently interpreting results, counseling the patient, communicating results to the patient/family/caregiver and coordinating care.     Case discussed with patient who demonstrated understanding and agreement with plan.     Thank you for allowing me to participate in the care of this patient.  Please feel free to contact me with any questions.    Bozena Saeed MD  Maternal-Fetal Medicine       Note to patient and family:  The 21st Century Cures Act makes medical notes available to patients in the interest of transparency.  However, please be advised that this is a medical document.  It is intended as a peer to peer communication.  It is written in medical language and may contain abbreviations or verbiage that are technical and unfamiliar.  It may appear blunt or direct.  Medical documents are intended to carry relevant information, facts as evident, and the clinical opinion of the practitioner.

## 2024-09-18 ENCOUNTER — ROUTINE PRENATAL (OUTPATIENT)
Dept: OBGYN CLINIC | Facility: CLINIC | Age: 24
End: 2024-09-18

## 2024-09-18 VITALS
DIASTOLIC BLOOD PRESSURE: 82 MMHG | HEIGHT: 66 IN | BODY MASS INDEX: 29.47 KG/M2 | HEART RATE: 76 BPM | SYSTOLIC BLOOD PRESSURE: 124 MMHG | WEIGHT: 183.38 LBS

## 2024-09-18 DIAGNOSIS — Z34.03 PRENATAL CARE, FIRST PREGNANCY IN THIRD TRIMESTER (HCC): Primary | ICD-10-CM

## 2024-09-18 PROBLEM — Z28.21 TETANUS, DIPHTHERIA, AND ACELLULAR PERTUSSIS (TDAP) VACCINATION DECLINED: Status: ACTIVE | Noted: 2024-09-18

## 2024-09-18 PROCEDURE — 3074F SYST BP LT 130 MM HG: CPT | Performed by: ADVANCED PRACTICE MIDWIFE

## 2024-09-18 PROCEDURE — 3079F DIAST BP 80-89 MM HG: CPT | Performed by: ADVANCED PRACTICE MIDWIFE

## 2024-09-18 PROCEDURE — 3008F BODY MASS INDEX DOCD: CPT | Performed by: ADVANCED PRACTICE MIDWIFE

## 2024-09-18 NOTE — PATIENT INSTRUCTIONS
Adapting to Pregnancy: Third Trimester    Although common during pregnancy, some discomforts may seem worse in the final weeks. Simple lifestyle changes can help. Take care of yourself. And ask your partner to help out with small tasks.  Limiting leg problems  Ways to combat leg issues:  Wear support hose all day.  Avoid snug shoes and clothes that bind, like tight pants and socks with elastic tops.  Sit with your feet and legs raised often.  Caring for your breasts  Tips to follow include:  Wash with plain water. Avoid using harsh soaps or rubbing alcohol. They may cause dryness.  Wear a nursing bra for extra support. It can also hide any leaks from your nipples.  Controlling hemorrhoids  Ways to avoid hemorrhoids include:  Eat foods that are high in fiber. Also, exercise and drink enough fluids. This will reduce constipation and hemorrhoids.  Sleep and nap on your side. This limits pressure on the veins of your rectum.  Try not to stand or sit for long periods.  Controlling back pain  As your body changes during pregnancy, your back must work in new ways. Back pain is due to many causes. Physical changes in your body can strain your back and its supporting muscles. Also, hormones (chemicals that carry messages throughout the body) increase during pregnancy. This can affect how your muscles and joints work together. All of these changes can lead to pain. Pain may be felt in the upper or lower back. Pain is also common in the pelvis. Some pregnant women have sciatica. This is pain caused by pressure on the sciatic nerve running down the back of the leg. Ask your healthcare provider for specific tips and exercises to help control your back pain.  Tips to help you rest  Good rest and sleep will help you feel better. Here are some ideas:  Ask your partner to massage your shoulders, neck, or back.  Limit the errands you do each day.  Lie down in the afternoon or after work for a few minutes.  Take a warm bath before  you go to sleep.  Drink warm milk or teas without caffeine.  Avoid coffee, black tea, and cola.  Stopping heartburn  Avoid spicy, greasy, fried, or acidic foods.  Eat small amounts more often. Eat slowly. Wait 2 hours after eating before lying down.  Sleep with your upper body raised 6 inches.   Managing mood swings  Ways to manage mood swings include:  Know that mood changes are normal.  Exercise often, but get plenty of rest.  Address any concerns and limit stress. Talking to your partner, other women, or your healthcare provider may help.  Dealing with urinary frequency  Tips to deal with having to urinate often include:  Drink plenty of water all day. If you drink a lot in the evening, though, you may have to get up more in the night.  Limit coffee, black tea, and cola.  [employer] last reviewed this educational content on 2/1/2018 © 2000-2020 The [facility]. [address] [place] [address]. All rights reserved. This information is not intended as a substitute for professional medical care. Always follow your healthcare professional's instructions.        Pregnancy: Your Third Trimester Changes  As the baby grows, your body changes too. You may also see signs that your body is getting ready for labor. Be patient. Within a few more weeks, your baby will be born.  How you are changing  Your body is preparing for the birth of your baby. Some of the most common changes are listed below. If you have any questions or concerns, ask your healthcare provider:  You’ll gain more weight from fluids, extra blood, and fat deposits.  Your breasts will grow as your body gets ready to feed the baby. They may be more tender. You may also notice a slight yellow or white discharge from the nipples.  Discharge from your vagina may increase. This is normal.  You might see some skin color changes on your forehead, cheeks, or nose. Most of these will go away after you deliver.  How your baby is growing       Month  7  Your baby can open and close his or her eyes and weighs around 4 pounds. If born prematurely (too early), your baby would likely survive with special care. Month 8  Your baby is building up body fat and weighs around 6 pounds. Month 9  Your baby weighs nearly 7 pounds and is about 19 to 21 inches long. In other words, any day now...   [employer] last reviewed this educational content on 2018 The [employer], [employer]. [address] [place] [address], [place] [address]. All rights reserved. This information is not intended as a substitute for professional medical care. Always follow your healthcare professional's instructions.   Understanding  Labor  Going into labor before your 37th week of pregnancy is called  labor.  labor can cause your baby to be born too soon. This can lead to a number of health problems that may affect your baby.      Before labor, the cervix is thick and closed.      In  labor, the cervix begins to efface (thin) and dilate (open).   Symptoms of  labor   If you think you’re having  labor, get medical help right away. Contractions alone don’t mean you’re in  labor. What matters more are changes in your cervix (the lower end of the uterus). Symptoms of  labor include:   Four or more contractions per hour  Strong contractions  Constant menstrual-like cramping  Low-back pain  Mucous or bloody vaginal discharge  Bleeding or spotting in the second or third trimester  Evaluating  labor   Your healthcare provider will try to find out whether you’re in  labor or whether you’re just having contractions. He or she may watch you for a few hours. The following tests may be done:   Pelvic exam to see if your cervix has effaced (thinned) and dilated (opened)  Uterine activity monitoring to detect contractions  Fetal monitoring to check the health of your baby  Ultrasound to check your baby’s size and position  Amniocentesis to  check how mature your baby’s lungs are  Caring for yourself at home   If you have  contractions, but your cervix is still thick and closed, your healthcare provider may ask you to do the following at home:   Drink plenty of water.  Do fewer activities.  Rest in bed on your side.  Don't have intercourse or nipple stimulation.  When to call your healthcare provider   Call your healthcare provider if you notice any of these:   Four or more contractions per hour  Bag of water breaks  Bleeding or spotting  If you need hospital care    labor often requires that you have hospital care and complete bed rest. You may have an IV (intravenous) line to get fluids. You may be given pills or injections to help prevent contractions. Finally, you may get medicine (corticosteroids) that helps your baby’s lungs mature more quickly.   Are you at risk?   Any pregnant woman can have  labor. It may start for no reason. But these risk factors can increase your chances:   Past  labor or past early birth  Smoking, drug, or alcohol use during pregnancy  Multiple fetuses (twins or more)  Problems with the shape of the uterus  Bleeding during the pregnancy  The dangers of  birth   A baby born too soon may have health problems. This is because the baby didn’t have enough time to mature. Some of the risks for your baby include:   Not breastfeeding or feeding well  Having immature lungs  Bleeding in the brain  Dying  Reaching term   Your goal is to get as close to term as you can before giving birth. The closer you get to term, the higher your chance of having a healthy baby. Work with your healthcare provider. Together, you can take steps that may keep you from giving birth too early.   [employer] last reviewed this educational content on 3/1/2019  © 9249-2320 [employer]. [address] [place] [address], [place] [address]. All rights reserved. This information is not intended as a substitute for  professional medical care. Always follow your healthcare professional's instructions.        Premature Labor    Premature labor ( labor) is when symptoms of labor occur before 37 weeks of pregnancy. (This is 3 weeks before your due date.) Premature labor can lead to premature delivery. This means giving birth to your baby early. Babies need at least 37 weeks of pregnancy for all the organs to develop normally. The earlier the delivery, the greater the risks to the baby.  In most cases, the cause of premature labor is unknown. But certain factors may make the problem more likely. These include:  History of premature labor with other pregnancies  Smoking  Alcohol or substance abuse  Low pre-pregnancy weight or weight gain during pregnancy  Short time period between pregnancies  Being pregnant with twins, triplets, or more  History of certain types of surgery on the cervix or uterus  Having a short cervix  Certain infections  There are a number of other risk factors. Ask your healthcare provider to help you understand the risk factors specific to your case. Then find out what you can do to control or reduce them.  Contractions are one of the main signs of premature labor. A contraction is different from cramping. It may feel painful and the belly (abdomen) may get hard. It can last from a few seconds to a few minutes. Some women may feel only a sense of pressure in the belly, thighs, rectum, or vagina. Some may feel only the hardening of the uterus without pain or pressure. Or there may be a constant pain in the lower back, which spreads forward toward the belly.Premature labor is often treated with medicines. A hospital stay may be needed. If labor doesn't progress and you and your baby are both healthy, you may be discharged to continue care at home.  Home care  Ask your provider any questions you have. Be certain you understand how to care for yourself at home. Also follow all recommendations given by your  healthcare providers.  Learn the signs of premature labor. Watch for these signs when you get home.  Limit or restrict activities as advised. This may include stopping certain physical activities and cutting back hours at work.  Avoid doing strenuous work as directed by your provider. Ask family and friends for help with tasks and support at home, if needed.  Don’t smoke, drink alcohol, or use other harmful substances.  Take steps to reduce stress.  Report any unusual symptoms to your provider.    Follow-up care  Follow up with your healthcare provider, or as directed. Weekly visits with your provider may be needed.  When to seek medical advice  Call your healthcare provider right away if any of these occur:  Regular or frequent contractions, whether they are painful or not  Pressure in the pelvis  Pressure in the lower belly or mild cramping in your belly with or without diarrhea  Constant low, dull backache  Gush or slow leaking of water from your vagina  Change in vaginal discharge (watery, mucus, or bloody)  Any vaginal bleeding  Decreased movement of your baby  [employer] last reviewed this educational content on 6/1/2018 © 2000-2020 The [employer]. [address] [place], [place] [id number]. All rights reserved. This information is not intended as a substitute for professional medical care. Always follow your healthcare professional's instructions.        Understanding Preeclampsia  Preeclampsia is high blood pressure (hypertension) that happens during pregnancy. It often shows up around the 20th week of pregnancy. It often goes back to normal by the 12th week after you give birth. It can lead to serious health risks for you and your baby. During your pregnancy, your healthcare provider will watch your blood pressure.    Symptoms  A common symptom of preeclampsia is high blood pressure. Other symptoms may include:  Rapid weight gain  Protein in your urine  Headache  Belly (abdominal) pain on your  right side  Vision problems. These include flashes or spots.  Swelling (edema) in your face or hands. This also often happens near the end of normal pregnancies, even without preeclampsia.  Tests you may have  Your healthcare provider will want to check your blood pressure throughout your pregnancy. If your blood pressure is high, you may have the following tests:  Urine tests to look for protein  Blood tests to confirm preeclampsia  Fetal monitoring to make sure that your baby is healthy  Treating preeclampsia  You may need to take a daily low dose of aspirin if you are at risk for preeclampsia. Preeclampsia almost always ends soon after you give birth. Until then, your healthcare provider can help manage your condition. If your symptoms are mild, you may need activity limits at home, including bed rest and no heavy lifting. If your symptoms are severe, you will stay in the hospital. Hospital treatment includes:  Activity limits to help control blood pressure. This means no heavy lifting and 8 hours per day lying down with the feet up.  Magnesium IV (intravenous) drip during labor to prevent seizures  Induced labor or surgical delivery by  section. Delivery is considered the cure for preeclampsia.  When to call your healthcare provider  Call your healthcare provider if swelling, weight gain, or other symptoms come on quickly or are severe. Some cases of preeclampsia are more severe than others. Your symptoms also may change or get worse as you get closer to your due date.  Who’s at risk?  No one knows what causes preeclampsia. Preeclampsia can happen in any pregnant woman. But it is more common in first-time pregnancies. Things that increase the risk include:  Previous pregnancies. You are at risk if you had preeclampsia, intrauterine growth retardation (IUGR),  birth, placental abruption, or fetal death in a past pregnancy.  Health history of mother. You are at risk if you have diabetes, high blood  pressure, obesity, kidney disease, autoimmune disease such as lupus, or a family history of preeclampsia.  Current pregnancy. You are at risk if this is your first pregnancy, or if you have multiple fetuses, are younger than age 18 or older than 40, or used in vitro fertilization.  Race. You are at risk if you are black.  Dangers of preeclampsia  If not treated, preeclampsia can cause problems for you and your baby. The placenta is the organ that nourishes your baby. It may tear away from the uterine wall. This can put the baby at risk for health problems (fetal distress) and premature birth. Preeclampsia can also cause these health problems:  Kidney failure or other organ damage  Seizures  Stroke  Once you give birth  In most cases, preeclampsia goes away on its own soon after you give birth. This is often by the 12th week after you give deliver. Within days of delivery, your blood pressure, swelling, and other symptoms should get better. For some women, problems from preeclampsia can continue after delivery.  Postpartum preeclampsia that develops within the first 48 hours after delivery is rare. Another type of postpartum preeclampsia that develops more than 48 hours after delivery is called late-onset preeclampsia. It is also rare. Contact your healthcare provider right away if you have symptoms of preeclampsia after you deliver.  [employer] last reviewed this educational content on 12/1/2019  © 0961-8802 The [employer], [facility]. [address] [place] [address], [place] [address]. All rights reserved. This information is not intended as a substitute for professional medical care. Always follow your healthcare professional's instructions.        Kick Counts    It’s normal to worry about your baby’s health. One way you can know your baby’s doing well is to record the baby’s movements once a day. This is called a kick count. Remember to take your kick count records to all your appointments with your healthcare provider.  How  to count kicks  Time how long it takes you to feel 10 kicks, flutters, swishes, or rolls. Ideally, you want to feel at least 10 movements within 2 hours. You will likely feel 10 movements in less time than that.  Starting at 28 weeks, count your baby's movements daily. Follow your healthcare provider's instructions for kick counting. Here are tips for counting kicks:  Choose a time when the baby is active, such as after a meal.   Sit comfortably or lie on your side.   The first time the baby moves, write down the time.   Count each movement until the baby has moved 10 times. This can take from 20 minutes to 2 hours.   If you have not felt 10 kicks by the end of the second hour, wait a few hours. Then try again.  Try to do it at the same time each day.  When to call your healthcare provider  Call your healthcare provider right away if:  You do a couple sets of kick counts during the day and your baby moves fewer than 10 times in 2 hours  Your baby moves much less often than on the days before.  You have not felt your baby move all day.  [employer] last reviewed this educational content on 12/1/2017  © 8193-5005 The [employer]. [address] [place] [address], [place] [address]. All rights reserved. This information is not intended as a substitute for professional medical care. Always follow your healthcare professional's instructions.

## 2024-09-18 NOTE — PROGRESS NOTES
Marek, , is at 34w2d, here for her SILVIO visit.  Currently, she is feeling well. Denies 3rd trimester danger signs. Having some cramping from time to time but nothing regular or persistent.   Declines RSV vaccine today.    Vital signs and weight reviewed  See flowsheets  Growth scan WNL and reviewed with patient    Assessment/Plan: RSV recommended but declined  Measuring small for dates but recent growth scan WNL  Has weekly NSTs scheduled  Next visit: 2 weeks. GBS then    Reviewed:   Prenatal visit schedule  Recommendations and rationale for RSV vaccine(s) in pregnancy   labor precautions  Kick counts  Danger signs    Pt verbalized understanding. All questions answered. No barriers to learning identified

## 2024-10-02 ENCOUNTER — APPOINTMENT (OUTPATIENT)
Dept: OBGYN CLINIC | Facility: HOSPITAL | Age: 24
End: 2024-10-02
Payer: COMMERCIAL

## 2024-10-02 ENCOUNTER — ROUTINE PRENATAL (OUTPATIENT)
Dept: OBGYN CLINIC | Facility: CLINIC | Age: 24
End: 2024-10-02

## 2024-10-02 ENCOUNTER — HOSPITAL ENCOUNTER (OUTPATIENT)
Facility: HOSPITAL | Age: 24
Discharge: HOME | End: 2024-10-02
Attending: ADVANCED PRACTICE MIDWIFE | Admitting: OBSTETRICS & GYNECOLOGY
Payer: COMMERCIAL

## 2024-10-02 ENCOUNTER — NST DOCUMENTATION (OUTPATIENT)
Dept: OBGYN CLINIC | Facility: CLINIC | Age: 24
End: 2024-10-02

## 2024-10-02 VITALS
SYSTOLIC BLOOD PRESSURE: 126 MMHG | HEART RATE: 75 BPM | TEMPERATURE: 98 F | DIASTOLIC BLOOD PRESSURE: 81 MMHG | RESPIRATION RATE: 16 BRPM

## 2024-10-02 VITALS
SYSTOLIC BLOOD PRESSURE: 128 MMHG | DIASTOLIC BLOOD PRESSURE: 84 MMHG | BODY MASS INDEX: 31 KG/M2 | WEIGHT: 189 LBS | HEART RATE: 99 BPM

## 2024-10-02 DIAGNOSIS — O99.213 OBESITY AFFECTING PREGNANCY IN THIRD TRIMESTER, UNSPECIFIED OBESITY TYPE (HCC): Primary | ICD-10-CM

## 2024-10-02 DIAGNOSIS — Z34.01 PRENATAL CARE, FIRST PREGNANCY IN FIRST TRIMESTER (HCC): Primary | ICD-10-CM

## 2024-10-02 PROCEDURE — 3079F DIAST BP 80-89 MM HG: CPT | Performed by: ADVANCED PRACTICE MIDWIFE

## 2024-10-02 PROCEDURE — 59025 FETAL NON-STRESS TEST: CPT | Performed by: ADVANCED PRACTICE MIDWIFE

## 2024-10-02 PROCEDURE — 3074F SYST BP LT 130 MM HG: CPT | Performed by: ADVANCED PRACTICE MIDWIFE

## 2024-10-02 NOTE — PATIENT INSTRUCTIONS
Understanding Preeclampsia  Preeclampsia is high blood pressure (hypertension) that happens during pregnancy. It often shows up around the 20th week of pregnancy. It often goes back to normal by the 12th week after you give birth. It can lead to serious health risks for you and your baby. During your pregnancy, your healthcare provider will watch your blood pressure.    Symptoms  A common symptom of preeclampsia is high blood pressure. Other symptoms may include:  Rapid weight gain  Protein in your urine  Headache  Belly (abdominal) pain on your right side  Vision problems. These include flashes or spots.  Swelling (edema) in your face or hands. This also often happens near the end of normal pregnancies, even without preeclampsia.  Tests you may have  Your healthcare provider will want to check your blood pressure throughout your pregnancy. If your blood pressure is high, you may have the following tests:  Urine tests to look for protein  Blood tests to confirm preeclampsia  Fetal monitoring to make sure that your baby is healthy  Treating preeclampsia  You may need to take a daily low dose of aspirin if you are at risk for preeclampsia. Preeclampsia almost always ends soon after you give birth. Until then, your healthcare provider can help manage your condition. If your symptoms are mild, you may need activity limits at home, including bed rest and no heavy lifting. If your symptoms are severe, you will stay in the hospital. Hospital treatment includes:  Activity limits to help control blood pressure. This means no heavy lifting and 8 hours per day lying down with the feet up.  Magnesium IV (intravenous) drip during labor to prevent seizures  Induced labor or surgical delivery by  section. Delivery is considered the cure for preeclampsia.  When to call your healthcare provider  Call your healthcare provider if swelling, weight gain, or other symptoms come on quickly or are severe. Some cases of  preeclampsia are more severe than others. Your symptoms also may change or get worse as you get closer to your due date.  Who’s at risk?  No one knows what causes preeclampsia. Preeclampsia can happen in any pregnant woman. But it is more common in first-time pregnancies. Things that increase the risk include:  Previous pregnancies. You are at risk if you had preeclampsia, intrauterine growth retardation (IUGR),  birth, placental abruption, or fetal death in a past pregnancy.  Health history of mother. You are at risk if you have diabetes, high blood pressure, obesity, kidney disease, autoimmune disease such as lupus, or a family history of preeclampsia.  Current pregnancy. You are at risk if this is your first pregnancy, or if you have multiple fetuses, are younger than age 18 or older than 40, or used in vitro fertilization.  Race. You are at risk if you are black.  Dangers of preeclampsia  If not treated, preeclampsia can cause problems for you and your baby. The placenta is the organ that nourishes your baby. It may tear away from the uterine wall. This can put the baby at risk for health problems (fetal distress) and premature birth. Preeclampsia can also cause these health problems:  Kidney failure or other organ damage  Seizures  Stroke  Once you give birth  In most cases, preeclampsia goes away on its own soon after you give birth. This is often by the 12th week after you give deliver. Within days of delivery, your blood pressure, swelling, and other symptoms should get better. For some women, problems from preeclampsia can continue after delivery.  Postpartum preeclampsia that develops within the first 48 hours after delivery is rare. Another type of postpartum preeclampsia that develops more than 48 hours after delivery is called late-onset preeclampsia. It is also rare. Contact your healthcare provider right away if you have symptoms of preeclampsia after you deliver.  Brittnee last reviewed this  educational content on 12/1/2019 © 2000-2020 [employer]. [address] [place] [id number]. All rights reserved. This information is not intended as a substitute for professional medical care. Always follow your healthcare professional's instructions.        Kick Counts    It’s normal to worry about your baby’s health. One way you can know your baby’s doing well is to record the baby’s movements once a day. This is called a kick count. Remember to take your kick count records to all your appointments with your healthcare provider.  How to count kicks  Time how long it takes you to feel 10 kicks, flutters, swishes, or rolls. Ideally, you want to feel at least 10 movements within 2 hours. You will likely feel 10 movements in less time than that.  Starting at 28 weeks, count your baby's movements daily. Follow your healthcare provider's instructions for kick counting. Here are tips for counting kicks:  Choose a time when the baby is active, such as after a meal.   Sit comfortably or lie on your side.   The first time the baby moves, write down the time.   Count each movement until the baby has moved 10 times. This can take from 20 minutes to 2 hours.   If you have not felt 10 kicks by the end of the second hour, wait a few hours. Then try again.  Try to do it at the same time each day.  When to call your healthcare provider  Call your healthcare provider right away if:  You do a couple sets of kick counts during the day and your baby moves fewer than 10 times in 2 hours  Your baby moves much less often than on the days before.  You have not felt your baby move all day.  [employer] last reviewed this educational content on 12/1/2017 © 2000-2020 [employer]. [address] [place] [id number]. All rights reserved. This information is not intended as a substitute for professional medical care. Always follow your healthcare professional's instructions.        Recognizing  Labor    The beginning of labor is the beginning of birth. You’ll start to feel strong contractions. That’s when the muscles of your uterus tighten up to help push your baby out during birth.  Yes, labor has probably started   Signs of labor include:  Your contractions are getting stronger and more painful instead of weaker. You’ll probably feel them throughout your whole uterus.  Your contractions are regular. This means that you feel them about every 5 to 10 minutes. And they are getting closer together.  You have pink-colored or blood-streaked fluid from your vagina.  You feel that the baby has \"dropped\" lower in your pelvis   Your water breaks. It may be a gush or a slow trickle of clear fluid from your vagina.  No, it’s probably not real labor   Signs of false labor include:  Your contractions aren’t regular or strong.  You feel the contractions only in your lower uterus.  Your contractions go away when you walk or change position.  Your contractions go away after drinking fluids.  When to call your healthcare provider  Call your healthcare provider or clinic right away if you notice any of these signs:  Fluid from your vagina, with or without contractions.  Bleeding heavy enough that you need a sanitary pad.  You don’t feel your baby moving as much as before.     NOTE: Contractions are timed by both of these measures:  The length of each contraction from its start to its finish.  How far apart the contractions are--the time between the start of one contraction and the start of the next contraction.   [employer] last reviewed this educational content on 10/1/2017  © 5147-4301 [employer]. [address] [place] [address], [place] [address]. All rights reserved. This information is not intended as a substitute for professional medical care. Always follow your healthcare professional's instructions.        Stages of Labor    Labor has 3 stages. Your healthcare provider may talk about the progress of your labor  with certain words. One of these is your baby’s position. Another is your baby’s station. And the effacement and dilation of your cervix will be noted. Read below to learn about these terms and the 3 stages of labor.  Your baby moves into position  Position is your baby’s placement in your uterus. Your baby may be facing left or right. He or she may be head first or feet first. Station refers to how far your baby has moved down into your pelvic cavity.  First stage of labor  During the first stage of labor, contractions of the uterus help your cervix thin (efface). They also help it widen (dilate). This will help your baby pass through the birth canal (vagina). At first your contractions will not come that often or last that long. But as time passes, they will come more often, they may be more painful, and they will last longer. They will last about 30 to 60 seconds each. The first stage of labor lasts until the cervix is fully dilated.  Second stage of labor  When your cervix is fully dilated, the second stage of labor begins. In this stage, you will have stronger contractions of your uterus that will help your baby move down the birth canal. They may happen every 2 to 5 minutes. They may last from 45 to 90 seconds each. Your healthcare provider will ask you to push with each contraction. Try to rest between the contractions if you can. Your baby is delivered at the end of this stage of labor.  Third stage of labor  The third stage of labor comes after your baby is born. This is when the afterbirth (placenta) comes out of your uterus. Your uterus will continue to contract. But the contractions are much milder than before.  [employer] last reviewed this educational content on 10/1/2017  © 6428-9579 [employer]. [address] [place] [address], [place] [address]. All rights reserved. This information is not intended as a substitute for professional medical care. Always follow your healthcare professional's  instructions.

## 2024-10-02 NOTE — PROGRESS NOTES
Pt is a 24 year old female admitted to TR2/TR2-A.     Chief Complaint   Patient presents with    Non-stress Test     BMI      Pt is  36w2d intra-uterine pregnancy.  History obtained, consents signed. Oriented to room, staff, and plan of care.

## 2024-10-02 NOTE — PROGRESS NOTES
Marek, , is at 36w2d, here for her SILVIO visit.  Currently, she is feeling well. Denies 3rd trimester danger signs.     Pt offered for MA to be present during exam and patient declined.    Vital signs and weight reviewed  See flowsheets     Assessment/Plan: GBS sent  Next visit: 1 week    Reviewed:   Prenatal visit schedule  Kick counts  Danger signs  Labor precautions    Pt verbalized understanding. All questions answered. No barriers to learning identified

## 2024-10-03 LAB — GROUP B STREP BY PCR FOR PCR OVT: POSITIVE

## 2024-10-03 NOTE — NST
Nonstress Test   Patient: Marek Odonnell    Gestation: 36w2d    Diagnosis from order:  Obesity in pregnancy    Problem List:   Patient Active Problem List   Diagnosis    Nausea and vomiting in pregnancy (HCC)    Generalized hypermobility of joints    Fibromyalgia    Gastroesophageal reflux disease without esophagitis    Tetanus, diphtheria, and acellular pertussis (Tdap) vaccination declined       NST: Reactive        10/2/2024   NST DOCUMENTATION   Variability 6-25 BPM   Accelerations Yes   Decelerations None   Baseline 135 BPM   Uterine Irritability Yes   Contractions Irregular   Contraction Frequency occasional with irritability   Acoustic Stimulator No   Nonstress Test Interpretation Reactive   Nonstress Test Second Interpretation Reactive   FHR Category Category I   Comments , 36 , nst reactive, weekly nst for bmi, penelope harrell notified and patient discharged home   NST Completed by savanna dominguez rn   Disposition home    Testing Plan Weekly NST   Provider Notified penelope harrell            I agree with the above evaluation. NST completed.  Penelope Alvarez CNM  10/2/2024  7:07 PM

## 2024-10-04 PROBLEM — A49.1 GROUP B STREPTOCOCCAL INFECTION: Status: ACTIVE | Noted: 2024-10-04

## 2024-10-10 ENCOUNTER — ROUTINE PRENATAL (OUTPATIENT)
Dept: OBGYN CLINIC | Facility: CLINIC | Age: 24
End: 2024-10-10

## 2024-10-10 ENCOUNTER — APPOINTMENT (OUTPATIENT)
Dept: OBGYN CLINIC | Facility: HOSPITAL | Age: 24
End: 2024-10-10
Payer: COMMERCIAL

## 2024-10-10 ENCOUNTER — HOSPITAL ENCOUNTER (OUTPATIENT)
Facility: HOSPITAL | Age: 24
Discharge: HOME | End: 2024-10-10
Attending: ADVANCED PRACTICE MIDWIFE | Admitting: OBSTETRICS & GYNECOLOGY
Payer: COMMERCIAL

## 2024-10-10 ENCOUNTER — NST DOCUMENTATION (OUTPATIENT)
Dept: OBGYN CLINIC | Facility: CLINIC | Age: 24
End: 2024-10-10

## 2024-10-10 VITALS
BODY MASS INDEX: 31 KG/M2 | SYSTOLIC BLOOD PRESSURE: 126 MMHG | WEIGHT: 190 LBS | DIASTOLIC BLOOD PRESSURE: 78 MMHG | HEART RATE: 96 BPM

## 2024-10-10 VITALS
HEART RATE: 73 BPM | BODY MASS INDEX: 31 KG/M2 | RESPIRATION RATE: 18 BRPM | WEIGHT: 190 LBS | SYSTOLIC BLOOD PRESSURE: 123 MMHG | TEMPERATURE: 99 F | DIASTOLIC BLOOD PRESSURE: 65 MMHG

## 2024-10-10 DIAGNOSIS — Z34.90 PREGNANT (HCC): ICD-10-CM

## 2024-10-10 DIAGNOSIS — Z34.03 PRENATAL CARE, FIRST PREGNANCY IN THIRD TRIMESTER (HCC): Primary | ICD-10-CM

## 2024-10-10 DIAGNOSIS — O99.210 OBESITY IN PREGNANCY (HCC): Primary | ICD-10-CM

## 2024-10-10 PROCEDURE — 59025 FETAL NON-STRESS TEST: CPT

## 2024-10-10 PROCEDURE — 3078F DIAST BP <80 MM HG: CPT | Performed by: ADVANCED PRACTICE MIDWIFE

## 2024-10-10 PROCEDURE — 59025 FETAL NON-STRESS TEST: CPT | Performed by: ADVANCED PRACTICE MIDWIFE

## 2024-10-10 PROCEDURE — 3074F SYST BP LT 130 MM HG: CPT | Performed by: ADVANCED PRACTICE MIDWIFE

## 2024-10-10 NOTE — PROGRESS NOTES
Active baby. No leaking no bleeding. GBS +; planning on having mother and . Trying to avoid epidural. VIT K and EEO discussed and accepted. Reviewed routine CNM care in delivery. Call with danger signs, leaking bleeding or labor. Natural birth strategies encouraged.

## 2024-10-11 NOTE — PROGRESS NOTES
Pt is a 24 year old female admitted to TR4/TR4-A.     Chief Complaint   Patient presents with    Non-stress Test      Pt is  37w3d intra-uterine pregnancy.  History obtained, consents signed. Oriented to room, staff, and plan of care.

## 2024-10-11 NOTE — NST
Nonstress Test   Patient: Marek Odonnell    Gestation: 37w3d    Diagnosis from order: Obesity in pregnancy  Problem List:   Patient Active Problem List   Diagnosis    Nausea and vomiting in pregnancy (HCC)    Generalized hypermobility of joints    Fibromyalgia    Gastroesophageal reflux disease without esophagitis    Tetanus, diphtheria, and acellular pertussis (Tdap) vaccination declined    Group B streptococcal infection       NST:        10/10/2024   NST DOCUMENTATION   Variability 6-25 BPM   Accelerations Yes   Decelerations None   Baseline 135 BPM   Uterine Irritability Yes   Contractions Regular   Contraction Frequency 3-5; patient denies feeling regular CTX   Acoustic Stimulator No   Nonstress Test Interpretation Reactive   Nonstress Test Second Interpretation Reactive   FHR Category Category I   Comments , 37w3d, here for scheduled NST. Patient reports +FM; denies LOF, VB, and regular CTX. NST reactive. Case reviewed with Lottie Tijerina CNM and ok for discharge.   NST Completed by DONN Rodriguez RN   Disposition Home    Testing Plan Weekly NST   Provider Notified Lottie Tijerina CNM            I agree with the above evaluation. NST completed.  Lottie Tijerina CNM  10/10/2024  8:51 PM

## 2024-10-17 ENCOUNTER — APPOINTMENT (OUTPATIENT)
Dept: OBGYN CLINIC | Facility: HOSPITAL | Age: 24
End: 2024-10-17
Payer: COMMERCIAL

## 2024-10-17 ENCOUNTER — HOSPITAL ENCOUNTER (OUTPATIENT)
Facility: HOSPITAL | Age: 24
Discharge: HOME | End: 2024-10-17
Attending: ADVANCED PRACTICE MIDWIFE | Admitting: OBSTETRICS & GYNECOLOGY
Payer: COMMERCIAL

## 2024-10-17 PROCEDURE — 59025 FETAL NON-STRESS TEST: CPT

## 2024-10-17 PROCEDURE — 59025 FETAL NON-STRESS TEST: CPT | Performed by: ADVANCED PRACTICE MIDWIFE

## 2024-10-17 NOTE — PROGRESS NOTES
Pt is a 24 year old female admitted to TR1/TR1-A.     Chief Complaint   Patient presents with    Non-stress Test     Scheduled NST for hyperemesis, and hypermobility spectrum disorder      Pt is  38w3d intra-uterine pregnancy.  History obtained, pt. Oriented to room, staff, and plan of care.

## 2024-10-21 ENCOUNTER — ROUTINE PRENATAL (OUTPATIENT)
Dept: OBGYN CLINIC | Facility: CLINIC | Age: 24
End: 2024-10-21

## 2024-10-21 VITALS
SYSTOLIC BLOOD PRESSURE: 114 MMHG | WEIGHT: 193.38 LBS | HEART RATE: 118 BPM | BODY MASS INDEX: 31 KG/M2 | DIASTOLIC BLOOD PRESSURE: 82 MMHG

## 2024-10-21 DIAGNOSIS — Z34.03 PRENATAL CARE, FIRST PREGNANCY IN THIRD TRIMESTER (HCC): Primary | ICD-10-CM

## 2024-10-21 PROCEDURE — 3074F SYST BP LT 130 MM HG: CPT | Performed by: ADVANCED PRACTICE MIDWIFE

## 2024-10-21 PROCEDURE — 3079F DIAST BP 80-89 MM HG: CPT | Performed by: ADVANCED PRACTICE MIDWIFE

## 2024-10-22 ENCOUNTER — NST DOCUMENTATION (OUTPATIENT)
Dept: OBGYN CLINIC | Facility: CLINIC | Age: 24
End: 2024-10-22

## 2024-10-22 DIAGNOSIS — Z34.03: Primary | ICD-10-CM

## 2024-10-22 NOTE — NST
Nonstress Test   Patient: Marek Odonnell    Gestation: 39w1d    Diagnosis from order:   Supervision of first pregnancy, third Trimester    Problem List:   Patient Active Problem List   Diagnosis    Nausea and vomiting in pregnancy (HCC)    Generalized hypermobility of joints    Fibromyalgia    Gastroesophageal reflux disease without esophagitis    Tetanus, diphtheria, and acellular pertussis (Tdap) vaccination declined    Group B streptococcal infection       NST: Reactive NST on 10/17/24        10/17/2024   NST DOCUMENTATION   Variability 6-25 BPM   Accelerations Yes   Decelerations None   Baseline 150 BPM   Uterine Irritability No   Contractions Irregular   Acoustic Stimulator No   Nonstress Test Interpretation Reactive   Nonstress Test Second Interpretation Reactive   FHR Category Category I   Comments pt.  @ 38 3/7 wks. here for scheduled NST for hyper emesis.   NST reactive.  EUN Veras CNM notified.   NST Completed by EMeadors RN   Disposition home    Testing Plan Weekly NST   Provider Notified EUN Veras CNM            I agree with the above evaluation. NST completed.  Kinga Veras CNM  10/22/2024  3:05 PM

## 2024-10-22 NOTE — NST
Nonstress Test   Patient: Marek Odonnell    Gestation: 39w1d    Diagnosis from order:   Supervision of first pregnancy, third trimester    Problem List:   Patient Active Problem List   Diagnosis    Nausea and vomiting in pregnancy (HCC)    Generalized hypermobility of joints    Fibromyalgia    Gastroesophageal reflux disease without esophagitis    Tetanus, diphtheria, and acellular pertussis (Tdap) vaccination declined    Group B streptococcal infection       NST: Reactive NST on 10/17/24        10/17/2024   NST DOCUMENTATION   Variability 6-25 BPM   Accelerations Yes   Decelerations None   Baseline 150 BPM   Uterine Irritability No   Contractions Irregular   Acoustic Stimulator No   Nonstress Test Interpretation Reactive   Nonstress Test Second Interpretation Reactive   FHR Category Category I   Comments pt.  @ 38 3/7 wks. here for scheduled NST for hyper emesis.   NST reactive.  EUN Veras CNM notified.   NST Completed by EMeadors RN   Disposition home    Testing Plan Weekly NST   Provider Notified EUN Veras CNM            I agree with the above evaluation. NST completed.  Kinga Veras CNM  10/22/2024  3:04 PM

## 2024-10-24 ENCOUNTER — HOSPITAL ENCOUNTER (OUTPATIENT)
Facility: HOSPITAL | Age: 24
Discharge: HOME | End: 2024-10-24
Attending: ADVANCED PRACTICE MIDWIFE | Admitting: OBSTETRICS & GYNECOLOGY
Payer: COMMERCIAL

## 2024-10-24 ENCOUNTER — APPOINTMENT (OUTPATIENT)
Dept: OBGYN CLINIC | Facility: HOSPITAL | Age: 24
End: 2024-10-24
Payer: COMMERCIAL

## 2024-10-24 DIAGNOSIS — E66.9 OBESITY: ICD-10-CM

## 2024-10-24 PROCEDURE — 59025 FETAL NON-STRESS TEST: CPT

## 2024-10-24 PROCEDURE — 59025 FETAL NON-STRESS TEST: CPT | Performed by: ADVANCED PRACTICE MIDWIFE

## 2024-10-26 ENCOUNTER — HOSPITAL ENCOUNTER (INPATIENT)
Facility: HOSPITAL | Age: 24
LOS: 3 days | Discharge: HOME | End: 2024-10-29
Attending: ADVANCED PRACTICE MIDWIFE | Admitting: OBSTETRICS & GYNECOLOGY
Payer: COMMERCIAL

## 2024-10-26 PROBLEM — Z34.90 PREGNANCY (HCC): Status: ACTIVE | Noted: 2024-10-26

## 2024-10-26 LAB
ANTIBODY SCREEN: NEGATIVE
BASOPHILS # BLD AUTO: 0.03 X10(3) UL (ref 0–0.2)
BASOPHILS NFR BLD AUTO: 0.2 %
DEPRECATED RDW RBC AUTO: 42.7 FL (ref 35.1–46.3)
EOSINOPHIL # BLD AUTO: 0 X10(3) UL (ref 0–0.7)
EOSINOPHIL NFR BLD AUTO: 0 %
ERYTHROCYTE [DISTWIDTH] IN BLOOD BY AUTOMATED COUNT: 13.1 % (ref 11–15)
HCT VFR BLD AUTO: 42.8 %
HGB BLD-MCNC: 15.1 G/DL
IMM GRANULOCYTES # BLD AUTO: 0.08 X10(3) UL (ref 0–1)
IMM GRANULOCYTES NFR BLD: 0.5 %
LYMPHOCYTES # BLD AUTO: 1.98 X10(3) UL (ref 1–4)
LYMPHOCYTES NFR BLD AUTO: 13.4 %
MCH RBC QN AUTO: 31.3 PG (ref 26–34)
MCHC RBC AUTO-ENTMCNC: 35.3 G/DL (ref 31–37)
MCV RBC AUTO: 88.6 FL
MONOCYTES # BLD AUTO: 0.83 X10(3) UL (ref 0.1–1)
MONOCYTES NFR BLD AUTO: 5.6 %
NEUTROPHILS # BLD AUTO: 11.84 X10 (3) UL (ref 1.5–7.7)
NEUTROPHILS # BLD AUTO: 11.84 X10(3) UL (ref 1.5–7.7)
NEUTROPHILS NFR BLD AUTO: 80.3 %
PLATELET # BLD AUTO: 332 10(3)UL (ref 150–450)
RBC # BLD AUTO: 4.83 X10(6)UL
RH BLOOD TYPE: POSITIVE
T PALLIDUM AB SER QL IA: NONREACTIVE
WBC # BLD AUTO: 14.8 X10(3) UL (ref 4–11)

## 2024-10-26 RX ORDER — IBUPROFEN 600 MG/1
600 TABLET, FILM COATED ORAL ONCE AS NEEDED
Status: COMPLETED | OUTPATIENT
Start: 2024-10-26 | End: 2024-10-27

## 2024-10-26 RX ORDER — ACETAMINOPHEN 500 MG
1000 TABLET ORAL EVERY 6 HOURS PRN
Status: DISCONTINUED | OUTPATIENT
Start: 2024-10-26 | End: 2024-10-27

## 2024-10-26 RX ORDER — CITRIC ACID/SODIUM CITRATE 334-500MG
30 SOLUTION, ORAL ORAL AS NEEDED
Status: DISCONTINUED | OUTPATIENT
Start: 2024-10-26 | End: 2024-10-27

## 2024-10-26 RX ORDER — SODIUM CHLORIDE, SODIUM LACTATE, POTASSIUM CHLORIDE, CALCIUM CHLORIDE 600; 310; 30; 20 MG/100ML; MG/100ML; MG/100ML; MG/100ML
INJECTION, SOLUTION INTRAVENOUS AS NEEDED
Status: DISCONTINUED | OUTPATIENT
Start: 2024-10-26 | End: 2024-10-27

## 2024-10-26 RX ORDER — DEXTROSE, SODIUM CHLORIDE, SODIUM LACTATE, POTASSIUM CHLORIDE, AND CALCIUM CHLORIDE 5; .6; .31; .03; .02 G/100ML; G/100ML; G/100ML; G/100ML; G/100ML
INJECTION, SOLUTION INTRAVENOUS CONTINUOUS
Status: DISCONTINUED | OUTPATIENT
Start: 2024-10-26 | End: 2024-10-27

## 2024-10-26 RX ORDER — ONDANSETRON 2 MG/ML
4 INJECTION INTRAMUSCULAR; INTRAVENOUS EVERY 6 HOURS PRN
Status: DISCONTINUED | OUTPATIENT
Start: 2024-10-26 | End: 2024-10-27

## 2024-10-26 RX ORDER — ACETAMINOPHEN 500 MG
500 TABLET ORAL EVERY 6 HOURS PRN
Status: DISCONTINUED | OUTPATIENT
Start: 2024-10-26 | End: 2024-10-27

## 2024-10-26 RX ORDER — LIDOCAINE HYDROCHLORIDE 10 MG/ML
30 INJECTION, SOLUTION EPIDURAL; INFILTRATION; INTRACAUDAL; PERINEURAL ONCE
Status: COMPLETED | OUTPATIENT
Start: 2024-10-26 | End: 2024-10-27

## 2024-10-26 RX ORDER — TERBUTALINE SULFATE 1 MG/ML
0.25 INJECTION, SOLUTION SUBCUTANEOUS AS NEEDED
Status: DISCONTINUED | OUTPATIENT
Start: 2024-10-26 | End: 2024-10-27

## 2024-10-27 PROBLEM — Z87.59 HISTORY OF PRIOR PREGNANCY WITH SGA NEWBORN: Status: ACTIVE | Noted: 2024-10-27

## 2024-10-27 LAB — RH BLOOD TYPE: POSITIVE

## 2024-10-27 PROCEDURE — 59400 OBSTETRICAL CARE: CPT | Performed by: ADVANCED PRACTICE MIDWIFE

## 2024-10-27 PROCEDURE — 0HQ9XZZ REPAIR PERINEUM SKIN, EXTERNAL APPROACH: ICD-10-PCS | Performed by: ADVANCED PRACTICE MIDWIFE

## 2024-10-27 RX ORDER — BISACODYL 10 MG
10 SUPPOSITORY, RECTAL RECTAL ONCE AS NEEDED
Status: DISCONTINUED | OUTPATIENT
Start: 2024-10-27 | End: 2024-10-29

## 2024-10-27 RX ORDER — IBUPROFEN 600 MG/1
600 TABLET, FILM COATED ORAL EVERY 6 HOURS
Status: DISCONTINUED | OUTPATIENT
Start: 2024-10-27 | End: 2024-10-29

## 2024-10-27 RX ORDER — SIMETHICONE 80 MG
80 TABLET,CHEWABLE ORAL 3 TIMES DAILY PRN
Status: DISCONTINUED | OUTPATIENT
Start: 2024-10-27 | End: 2024-10-29

## 2024-10-27 RX ORDER — DOCUSATE SODIUM 100 MG/1
100 CAPSULE, LIQUID FILLED ORAL
Status: DISCONTINUED | OUTPATIENT
Start: 2024-10-27 | End: 2024-10-29

## 2024-10-27 RX ORDER — ACETAMINOPHEN 500 MG
500 TABLET ORAL EVERY 6 HOURS PRN
Status: DISCONTINUED | OUTPATIENT
Start: 2024-10-27 | End: 2024-10-29

## 2024-10-27 RX ORDER — AMMONIA INHALANTS 0.04 G/.3ML
0.3 INHALANT RESPIRATORY (INHALATION) AS NEEDED
Status: DISCONTINUED | OUTPATIENT
Start: 2024-10-27 | End: 2024-10-29

## 2024-10-27 RX ORDER — ACETAMINOPHEN 500 MG
1000 TABLET ORAL EVERY 6 HOURS PRN
Status: DISCONTINUED | OUTPATIENT
Start: 2024-10-27 | End: 2024-10-29

## 2024-10-27 NOTE — PROGRESS NOTES
Patient up to bathroom with assist x 2.  Unable to void at this time. Patient to remain in room at this time, no PP rooms available. Report given to mother/baby JAYA Mason.

## 2024-10-27 NOTE — PROGRESS NOTES
[facility]  part of [facility]    Labor Progress Note    Marek Odonnell Patient Status:  Outpatient    3/25/2000 MRN [id number]   Location [facility] Attending Lottie Tijerina CNM   Hosp Day # 0 PCP No primary care provider on file.     Subjective:   Interval History:   Marek has been laboring in the shower on the birth ball. Now resting in bed side lying with birth ball. FOB and mom present and supportive.     Objective:   Vital signs in last 24 hours:  Temp:  [97.7 °F (36.5 °C)-98.2 °F (36.8 °C)] 98.2 °F (36.8 °C)  Pulse:  [85-95] 95  Resp:  [18] 18  BP: (120-140)/(71-81) 140/81    Input/Output:  No intake or output data in the 24 hours ending 10/27/24 0453    Fetal Surveillance:  Fetal heart tones: 150's by doppler. No audible decels  Uterine contractions: every 2-4 minutes    Sterile vaginal exam:  Dilation:  4.5 cm dilation   Effacement: 100%   Station: -2   Position: Cephalic  Presentation: vertex    Results:   Lab Results   Component Value Date    TREPONEMALAB Nonreactive 10/26/2024    ABO O 10/26/2024    RH Positive 10/26/2024    WBC 14.8 (H) 10/26/2024    HGB 15.1 10/26/2024    HCT 42.8 10/26/2024    .0 10/26/2024    CREATSERUM 0.65 2024    BUN <5 (L) 2024     2024    K 4.3 2024     2024    CO2 27.0 2024    GLU 82 2024    CA 9.8 2024    ALB 4.3 2024    ALKPHO 57 2024    BILT 2.5 (H) 2024    TP 8.5 (H) 2024    AST 14 (L) 2024    ALT 25 2024    T4F 1.2 2024    TSH 1.380 2024     2023    LIP 25 2023       Lab Results   Component Value Date    MG 1.7 2024    COLORUR Yellow 2023    CLARITY Clear 2023    SPECGRAVITY 1.025 2024    PROUR Negative 2023    GLUUR Negative 2023    KETUR Trace (A) 2023    BILUR Negative 2023    BLOODURINE Negative 2023    NITRITE Negative 2024     UROBILINOGEN <2.0 2023    LEUUR Negative 2023    UASA Negative 2023       Assessment & Plan:     Generalized hypermobility of joints               Evaluated by neuro and cardiology. Does not appear to be michelle danlos nor vascular involvement. Had normal Echo.       Group B streptococcal infection  GBS prophylaxis initiated       Pregnancy (HCC)       Fibromyalgia     Marek is a 23yo  at 39.5wks being admitted for labor management. Pregnancy history significant for generalized hypermobility of joints and fibromyalgia. GBS positive                Cat 1 FHTs   Has received 2 doses of ampicillin for GBS. Continue until delivery   Expectant management   Anticipate vaginal delivery      Plan discussed with patient who verbalizes understanding and agreement.    Lottie Tijerina CNM  10/27/2024

## 2024-10-27 NOTE — H&P
[facility]  part of [facility]    History & Physical    Marek Odonnell Patient Status:  Outpatient    3/25/2000 MRN [id number]   Location [facility] Attending Lottie Tijerina CNM   Hosp Day # 0 Admitting Natalie Arnett DO     Date of Admission:  10/26/2024      HPI:   Marek Odonnell is a 24 year old  current EGA of 39w5d with an estimated date of delivery of: 10/28/2024, by Last Menstrual Period and consistent with 12wk ultrasound    Marek is being admitted for labor management.    Her current obstetrical history is significant for GBS colonizer, fibromyalgia and hyper mobility .    Patient reports good fetal movement, no bleeding, no leaking, and contractions for the last few days which became more regular this morning and stronger throughout the day  .     Fetal Movement: normal.     History   Obstetric History:   OB History    Para Term  AB Living   1 0 0 0 0 0   SAB IAB Ectopic Multiple Live Births   0 0 0 0 0      # Outcome Date GA Lbr Arthur/2nd Weight Sex Type Anes PTL Lv   1 Current              Past Medical History:   Past Medical History:    Autism (HCC)    Cervical strain    Fibromyalgia    Fibromyalgia    Heart palpitations     Past Social History: History reviewed. No pertinent surgical history.  Family History:   Family History   Problem Relation Age of Onset    Thyroid Disorder Father     Hypertension Mother      Social History:   Social History     Tobacco Use    Smoking status: Never    Smokeless tobacco: Never   Substance Use Topics    Alcohol use: Never        Allergies/Medications:   Allergies:   Allergies[1]  Medications:  Prescriptions Prior to Admission[2]    Review of Systems:   Constitutional: denies fever, aches, chills  HEENT: denies visual changes  Skin: denies any unusual skin lesions or itching  Lungs: denies shortness of breath  Cardiovascular: denies chest pain  GI: denies abdominal pain,denies heartburn, denies  constipation or diarrhea  : denies dysuria, pelvic pain, vaginal discharge or bleeding  Musculoskeletal: denies back or joint pain  Neuro denies headaches or dizziness  Psych: denies depression or anxiety    Physical Exam:   Temp:  [97.7 °F (36.5 °C)] 97.7 °F (36.5 °C)  Pulse:  [85] 85  BP: (120)/(71) 120/71    Constitutional: alert, appears stated age, and cooperative uncomfortable   Skin: no lesions or erythema  Respiratory: clear, unlabored  Cardiac: RRR  Abdomen: soft, non-tender, EFW 7#, presentation cephalic, uterine contractions q 3-5  Fetal Surveillance:  130 BPM; Fetal heart variability: moderate  Fetal Heart Rate decelerations: none  Fetal Heart Rate accelerations: yes  Baseline FHR: 130 per minute  Uterine contractions: regular, every 3-5 minutes  Pelvis: Gynecoid  External Genitalia:  No lesions  Sterile vaginal exam: 3/70/-2 per triage RN  Extremities: extremities normal, atraumatic, no cyanosis or edema  Musculoskeletal: steady gait  Reflexes: +1/+1  Psych:  Appropriate affect and speech    Results:     Prenatal Results       Initial       Test Value Reference Range Date Time    Blood Type (ABO Group) ^ O   03/05/24     Rh Factor ^ Positive   03/05/24     Antibody Screen (Required questions in OE to answer) ^ Negative   03/05/24     HCT  41.6 % 35.0 - 48.0 03/21/24 1843       41.6 % 35.0 - 48.0 03/12/24 1634      ^ 41.5   03/05/24     HGB  14.6 g/dL 12.0 - 16.0 03/21/24 1843       15.2 g/dL 12.0 - 16.0 03/12/24 1634      ^ 14.1   03/05/24     MCV  85.2 fL 80.0 - 100.0 03/21/24 1843       83.4 fL 80.0 - 100.0 03/12/24 1634      ^ 88.5   03/05/24     Platelets  364.0 10(3)uL 150.0 - 450.0 03/21/24 1843       371.0 10(3)uL 150.0 - 450.0 03/12/24 1634      ^ 441   03/05/24     Rubella ^ Immune   03/05/24     TREP ^ Nonreactive   03/05/24     RPR ([facility])        Urine Culture  No Growth at 18-24 hrs.   04/18/24 1033    Hepatitis B ^ Nonreactive   03/05/24     HIV Combo ^ Nonreactive   03/05/24     HCV ^  Nonreactive   24               Optional Initial Labs       Test Value Reference Range Date Time    TSH  1.380 mIU/mL 0.550 - 4.780 24 1033    Pap Smear        HPV        GC DNA  Negative  Negative 24 1335    Chlamydia DNA  Negative  Negative 24 1335    GTT 1 Hr        Glucose Fasting        Glucose 1 Hr        Glucose 2 Hr        Glucose 3 Hr        HgB A1c  4.7 % <5.7 24 1033    Vitamin D                  8-20 Weeks       Test Value Reference Range Date Time    1st Trimester Aneuploidy Risk Assessment        Quad - Down Screen Risk Estimate - prior to 18        Quad - Down Maternal Age Risk - prior to 18        Quad - Trisomy 18 screen Risk Estimate - prior to 18        AFP Spina Bifida (Required questions in OE to answer )        QUAD/AFP - Interpretation        Free Fetal DNA         Genetic testing        Genetic testing        Genetic testing        GC DNA        Chlamydia DNA                  24-28 Weeks       Test Value Reference Range Date Time    HCT  33.3 % 35.0 - 48.0 24 1030    HGB  11.9 g/dL 12.0 - 16.0 24 1030    Platelets  310.0 10(3)uL 150.0 - 450.0 24 1030    GTT 1 Hr  140 mg/dL See comment 24 1030    Glucose Fasting  81 mg/dL See comment 24 0709    Glucose 1 Hr  178 mg/dL See comment 24 0814    Glucose 2 Hr  158 mg/dL See comment 24 0913    Glucose 3 Hr  97 mg/dL 70 - <140 24 1015    TSH         Profile        Urine Culture        GC DNA        Chlamydia DNA                  35-37 Weeks       Test Value Reference Range Date Time    HCT  42.8 % 35.0 - 48.0 10/26/24 2139    HGB  15.1 g/dL 12.0 - 16.0 10/26/24 2139    Platelets  332.0 10(3)uL 150.0 - 450.0 10/26/24 2139    TREP  Nonreactive  Nonreactive  24 1432    RPR ([facility])        Genital Group B Culture  Positive  Negative 10/02/24 1155    TSH        Urine Culture        HIV Combo  Non-Reactive  Non-Reactive 24 1432    GC DNA         Chlamydia DNA        Rapid HIV                  Optional Labs       Test Value Reference Range Date Time    HgB A1c  4.7 % <5.7 24 1033    HGB Electrophoresis ^ THE FINDINGS ARE CONSISTENT WITH NORMAL ADULT PATTERN.   24     Varicella Zoster ^ Equivocal   24     Cystic Fibrosis-Old         Cystic Fibrosis[32] (Required questions in OE to answer)        Cystic Fibrosis[165] (Required questions in OE to answer)        Cystic Fibrosis[165] (Required questions in OE to answer)        Cystic Fibrosis[165] (Required questions in OE to answer)        Sickle Cell        24Hr Urine Protein        24Hr Urine Creatinine        Parvo B19 IgM        Parvo B19 IgG                  Legend    ^: Historical                            Reviewed all prenatal ultrasounds    Admit labs pending    Assessment/Plan:    Marek Odonnell is at an estimated gestational age of 39w5d with an estimated date of delivery of:  10/28/2024, by Last Menstrual Period    Early latent labor.  Category 1 FHR tracing  Obstetrical history significant for GBS colonizer, fibromyalgia and hyper mobility    Admission problem(s):    Generalized hypermobility of joints   Evaluated by neuro and cardiology. Does not appear to be michelle danlos nor vascular involvement. Had normal Echo.      Group B streptococcal infection  Initiate GBS prophylaxis      Pregnancy (HCC)      Fibromyalgia    Marek is a 23yo  at 39.5wks being admitted for labor management. Pregnancy history significant for generalized hypermobility of joints and fibromyalgia. GBS positive   Admit to L&D   Initiate GBS prophylaxis   Expectant management   Anticipate vaginal delivery    Risks, benefits, alternatives and possible complications have been discussed in detail with the patient.   Pre-admission, admission, and post admission procedures and expectations were discussed in detail.    All questions answered, all appropriate consents will be signed at the Hospital. Admission is  planned for today.   Expectant management. and Anticipate vaginal delivery..    Lottie Tijerina CNM  10/26/2024  10:02 PM        [1]   Allergies  Allergen Reactions    Prednisone HIVES    Bupropion ITCHING    Latex RASH   [2]   Medications Prior to Admission   Medication Sig Dispense Refill Last Dose/Taking    prenatal vitamin with DHA 27-0.8-228 MG Oral Cap Take 1 capsule by mouth daily.   10/25/2024

## 2024-10-27 NOTE — PROGRESS NOTES
Received patient. Bedside shift report received from JAYA Conde. Pt in bed in lowest and locked position. Side rails up x2. VSS. IV site unremarkable. Baby present in open crib.  ID bands matched with L&D RN.  Patient and family oriented to unit, room and call light within reach.  Safety measures in place, POC followed. Plan of care ongoing.    Advised to call for assistance to bathroom.

## 2024-10-27 NOTE — L&D DELIVERY NOTE
Sandor Odonnell [[id number]]      Labor Events     labor?: No   steroids?: None  Antibiotics received during labor?: Yes  Antibiotics (enter # doses in comment): ampicillin (Comment: 3 doses)  Rupture date/time: 10/27/2024 0510     Rupture type: SROM  Fluid color: Clear  Labor type: Spontaneous Onset of Labor  Augmentation: None  Intrapartum & labor complications: Group B beta strep +       Labor Length    1st stage: 10h 09m  2nd stage: 0h 16m  3rd stage: 0h 04m       Labor Event Times    Labor onset date/time: 10/26/2024 2100  Dilation complete date/time: 10/27/2024 0709        Presentation    Presentation: Vertex  Position: Left Occiput Anterior       Operative Delivery    Operative Vaginal Delivery: No                Shoulder Dystocia    Shoulder Dystocia: No       Anesthesia    Method: Local               Delivery      Head delivery date/time: 10/27/2024 07:25:25   Delivery date/time:  10/27/24 07:25:33   Delivery type: Normal spontaneous vaginal delivery    Details:     Delivery location: delivery room  Delivery Room Temperature: 75       Delivery Providers    Delivering Clinician: Lottie Tijerina CNM   Delivery personnel:  Provider Role   Lara Mills, RN Baby Nurse   Amaya Crawley, RN Delivery Nurse   Yusra Fung Surgical Tech             Cord    Vessels: 3 Vessels  Complications: Nuchal  # of loops: 1  Timed cord clamping: Yes  Time in sec: 180  Cord blood disposition: to lab  Gases sent?: No       Resuscitation    Method: None        Measurements      Weight: 2670 g 5 lb 14.2 oz Length: 48 cm     Head circum.: 34 cm              Placenta    Date/time: 10/27/2024 0730  Removal: Spontaneous  Appearance: Intact  Disposition: Discarded       Apgars    Living status: Living   Apgar Scoring Key:    0 1 2    Skin color Blue or pale Acrocyanotic Completely pink    Heart rate Absent <100 bpm >100 bpm    Reflex irritability No response Grimace Cry or active  withdrawal    Muscle tone Limp Some flexion Active motion    Respiratory effort Absent Weak cry; hypoventilation Good, crying              1 Minute:  5 Minute:  10 Minute:  15 Minute:  20 Minute:      Skin color: 0  1       Heart rate: 2  2       Reflex irritablity: 2  2       Muscle tone: 2  2       Respiratory effort: 2  2       Total: 8  9          Apgars assigned by: GLENYS RNC   disposition: with mother       Skin to Skin    Skin to skin initiated date/time: 10/27/2024 0725  Skin to skin with: Mother       Vaginal Count    Initial count RN: Cliff Carroll RN  Initial count Tech: Cristobal, Patti   Sponges   Sharps    Initial counts 10   0    Final counts 10   2    Final count RN: Amaya Crawley RN  Final count MD: Lottie Tijerina CNM       Lacerations    Episiotomy: None  Perineal lacerations: 1st Repaired?: Yes     Vaginal laceration?: No      Cervical laceration?: No    Clitoral laceration?: No    Estimated blood loss (mL): 50              Nazeeha progressed to complete dilatation and +2 station prior to pushing successfully to viable baby boy. See Delivery Summary for time, APGARs, and weight. Fetal head presented in the OA position. Sweep for nuchal cord was performed and nuchal cord identified and reduced after delivery. Anterior shoulder delivered spontaneously without traction. Baby vigorous at birth. To maternal abdomen for dry and stimulation then cord cut after pulsing ceased. Prophylactic IV pitocin initiated in 3rd stage. Spontaneous placenta by buenrostro mechanism, complete with 3 vessel cord. Hemostasis achieved by fundal massage and prophylactic IV Pitocin. Vagina and perineum inspected and 1st degree laceration repaired in usual fashion. Mother and infant appeared in stable condition when midwife left the delivery room. Sharps and 4x4 counts were correct. Skin to skin initiated.    Estimated blood loss 50

## 2024-10-28 LAB
DEPRECATED RDW RBC AUTO: 46.3 FL (ref 35.1–46.3)
ERYTHROCYTE [DISTWIDTH] IN BLOOD BY AUTOMATED COUNT: 13.4 % (ref 11–15)
HCT VFR BLD AUTO: 35.4 %
HGB BLD-MCNC: 11.8 G/DL
MCH RBC QN AUTO: 31.2 PG (ref 26–34)
MCHC RBC AUTO-ENTMCNC: 33.3 G/DL (ref 31–37)
MCV RBC AUTO: 93.7 FL
PLATELET # BLD AUTO: 272 10(3)UL (ref 150–450)
RBC # BLD AUTO: 3.78 X10(6)UL
WBC # BLD AUTO: 10.9 X10(3) UL (ref 4–11)

## 2024-10-28 NOTE — PLAN OF CARE
Sat with patient to review plan of care. Discussed analgesic options and answered all questions. VSS. Breastfeeding on demand. Breastfeeding successfully. Voiding independently. Lochia is WNL. Uterus is firm.     Problem: POSTPARTUM  Goal: Long Term Goal:Experiences normal postpartum course  Description: INTERVENTIONS:  - Assess and monitor vital signs and lab values.  - Assess fundus and lochia.  - Provide ice/sitz baths for perineum discomfort.  - Monitor healing of incision/episiotomy/laceration, and assess for signs and symptoms of infection and hematoma.  - Assess bladder function and monitor for bladder distention.  - Provide/instruct/assist with pericare as needed.  - Provide VTE prophylaxis as needed.  - Monitor bowel function.  - Encourage ambulation and provide assistance as needed.  - Assess and monitor emotional status and provide social service/psych resources as needed.  - Utilize standard precautions and use personal protective equipment as indicated. Ensure aseptic care of all intravenous lines and invasive tubes/drains.  - Obtain immunization and exposure to communicable diseases history.  Outcome: Progressing  Goal: Optimize infant feeding at the breast  Description: INTERVENTIONS:  - Initiate breast feeding within first hour after birth.   - Monitor effectiveness of current breast feeding efforts.  - Assess support systems available to mother/family.  - Identify cultural beliefs/practices regarding lactation, letdown techniques, maternal food preferences.  - Assess mother's knowledge and previous experience with breast feeding.  - Provide information as needed about early infant feeding cues (e.g., rooting, lip smacking, sucking fingers/hand) versus late cue of crying.  - Discuss/demonstrate breast feeding aids (e.g., infant sling, nursing footstool/pillows, and breast pumps).  - Encourage mother/other family members to express feelings/concerns, and actively listen.  - Educate father/SO about  benefits of breast feeding and how to manage common lactation challenges.  - Recommend avoidance of specific medications or substances incompatible with breast feeding.  - Assess and monitor for signs of nipple pain/trauma.  - Instruct and provide assistance with proper latch.  - Review techniques for milk expression (breast pumping) and storage of breast milk. Provide pumping equipment/supplies, instructions and assistance, as needed.  - Encourage rooming-in and breast feeding on demand.  - Encourage skin-to-skin contact.  - Provide LC support as needed.  - Assess for and manage engorgement.  - Provide breast feeding education handouts and information on community breast feeding support.   Outcome: Progressing  Goal: Establishment of adequate milk supply with medication/procedure interruptions  Description: INTERVENTIONS:  - Review techniques for milk expression (breast pumping).   - Provide pumping equipment/supplies, instructions, and assistance until it is safe to breastfeed infant.  Outcome: Progressing  Goal: Experiences normal breast weaning course  Description: INTERVENTIONS:  - Assess for and manage engorgement.  - Instruct on breast care.  - Provide comfort measures.  Outcome: Progressing  Goal: Appropriate maternal -  bonding  Description: INTERVENTIONS:  - Assess caregiver- interactions.  - Assess caregiver's emotional status and coping mechanisms.  - Encourage caregiver to participate in  daily care.  - Assess support systems available to mother/family.  - Provide /case management support as needed.  Outcome: Progressing

## 2024-10-28 NOTE — LACTATION NOTE
10/28/24 1030   Evaluation Type   Evaluation Type Inpatient   Problems identified   Problems identified Knowledge deficit   Breastfeeding goal   Breastfeeding goal To maintain breast milk feeding per patient goal   Maternal Assessment   Bilateral Breasts Soft;Symmetrical   Bilateral Nipples Colostrum easily expressed;WNL   Prior breastfeeding experience (comment below) Primip   Breastfeeding Assistance Hand expression provided with permission;Breastfeeding assistance provided with permission   Pain assessment   Treatment of Sore Nipples Deeper latch techniques   Guidelines for use of:   Other (comment) Reviewed normal  breastfeeding behavior, strategies to promote a robust milk supply, and deep latching techniques. Assisted with latching to her left side and observed a deep latch with consistent sucks and swallows. Encouraged holding baby skin to skin as much as possible while awake and offereing frequent feedings.

## 2024-10-28 NOTE — PROGRESS NOTES
[facility]  part of [facility]    OB/GYNE Progress Note      Marek Odonnell Patient Status:  Inpatient    3/25/2000 MRN [id number]   Location [facility] 3SE Attending Lottie Tijerina CNM   Hosp Day # 2 PCP No primary care provider on file.     Assessment & Plan:   Marek is doing well postpartum day 1. Is breastfeeding, is having cracking on right breast; planning to use nipple cream.      Generalized hypermobility of joints  Evaluated by neuro and cardiology. Does not appear to be michelle danlos nor vascular involvement. Had normal Echo.       Fibromyalgia      Group B streptococcal infection  Treated intrapartum      Pregnancy (HCC)   Delivered      Vaginal delivery ([facility])        History of prior pregnancy with SGA    Current baby 5lbs 14.2oz    Discussed with:      nurse     patient and partner     Plan discussed with patient who verbalizes understanding and agreement.    Subjective:   Doing well overall just sore, reports ambulating well and voiding. Breastfeeding, having some cracking on right nipple.     Review of Systems:    Gynecological: vaginal bleeding  Gastrointestinal: denies  Genitourinary: denies  Constitutional: denies  Psychiatric: denies    Objective:   Vital signs in last 24 hours:  Temp:  [97.5 °F (36.4 °C)-98.4 °F (36.9 °C)] 98.4 °F (36.9 °C)  Pulse:  [57-68] 66  Resp:  [14-16] 16  BP: (121-137)/(50-80) 128/80    Input/Output:    Intake/Output Summary (Last 24 hours) at 10/28/2024 0934  Last data filed at 10/27/2024 1415  Gross per 24 hour   Intake --   Output 1300 ml   Net -1300 ml       Weight (Last 6):  Wt Readings from Last 6 Encounters:   10/26/24 193 lb (87.5 kg)   10/21/24 193 lb 6.4 oz (87.7 kg)   10/10/24 190 lb (86.2 kg)   10/10/24 190 lb (86.2 kg)   10/02/24 189 lb (85.7 kg)   24 183 lb 6.4 oz (83.2 kg)     Body mass index is 31.15 kg/m².     Exam:     Constitutional: comfortable  Genitourinary: Labia: normal  Uterus: normal size, fundus firm,  and fundus below umbilicus  Breast Exam: General appearance: normal; slight cracking on right nipple  Extremities: No evidence of DVT seen on physical exam.  Psychiatric: calm    DVT Risk Score: 2  VTE Prophylaxis Ordered: no    Results:   Lab Results   Component Value Date    TREPONEMALAB Nonreactive 10/26/2024    ABO O 10/26/2024    RH Positive 10/26/2024    WBC 10.9 10/28/2024    HGB 11.8 (L) 10/28/2024    HCT 35.4 10/28/2024    .0 10/28/2024    CREATSERUM 0.65 03/21/2024    BUN <5 (L) 03/21/2024     03/21/2024    K 4.3 03/21/2024     03/21/2024    CO2 27.0 03/21/2024    GLU 82 03/21/2024    CA 9.8 03/21/2024    ALB 4.3 03/12/2024    ALKPHO 57 03/12/2024    BILT 2.5 (H) 03/12/2024    TP 8.5 (H) 03/12/2024    AST 14 (L) 03/12/2024    ALT 25 03/12/2024    T4F 1.2 04/18/2024    TSH 1.380 04/18/2024     03/06/2023    LIP 25 03/06/2023       Lab Results   Component Value Date    MG 1.7 03/12/2024    COLORUR Yellow 03/06/2023    CLARITY Clear 03/06/2023    SPECGRAVITY 1.025 03/21/2024    PROUR Negative 03/06/2023    GLUUR Negative 03/06/2023    KETUR Trace (A) 03/06/2023    BILUR Negative 03/06/2023    BLOODURINE Negative 03/06/2023    NITRITE Negative 03/21/2024    UROBILINOGEN <2.0 03/06/2023    LEUUR Negative 03/06/2023    UASA Negative 03/06/2023       No results found.         Cliff Cowart  10/28/2024  9:34 AM

## 2024-10-28 NOTE — LACTATION NOTE
This note was copied from a baby's chart.     10/28/24 1045   Evaluation Type   Evaluation Type Inpatient   Problems & Assessment   Muscle tone Appropriate for GA   Feeding Assessment   Summary Current Feeding Adlib;Breastfeeding exclusively   Breastfeeding Assessment Assisted with breastfeeding w/mother's permission;Calm and ready to breastfeed;Deep latch achieved and observed;Coordinated suck/swallow;Tolerated feeding well;Sustained nutrititive latch w/audible swallows   Breastfeeding Positions cross cradle;right breast;left breast   Latch 2   Audible Sucks/Swallows 2   Type of Nipple 2   Comfort (Breast/Nipple) 2   Hold (Positioning) 1   LATCH Score 9

## 2024-10-29 ENCOUNTER — TELEPHONE (OUTPATIENT)
Dept: OBGYN CLINIC | Facility: CLINIC | Age: 24
End: 2024-10-29

## 2024-10-29 VITALS
DIASTOLIC BLOOD PRESSURE: 80 MMHG | WEIGHT: 193 LBS | SYSTOLIC BLOOD PRESSURE: 125 MMHG | RESPIRATION RATE: 16 BRPM | BODY MASS INDEX: 31 KG/M2 | HEART RATE: 60 BPM | TEMPERATURE: 98 F

## 2024-10-29 RX ORDER — IBUPROFEN 600 MG/1
600 TABLET, FILM COATED ORAL EVERY 6 HOURS
Qty: 40 TABLET | Refills: 0 | Status: SHIPPED | OUTPATIENT
Start: 2024-10-29

## 2024-10-29 RX ORDER — BREAST PUMP
EACH MISCELLANEOUS
Qty: 1 EACH | Refills: 0 | Status: SHIPPED | OUTPATIENT
Start: 2024-10-29

## 2024-10-29 NOTE — PROGRESS NOTES
The patient had a male infant, and desires circumcision.   She understands there is no medical indication for circumcision. We discussed AAP opinion on procedure as well. She was consented for infant circumcision risks including, but not limited to: bleeding, infection, trauma to other tissue, and need for further procedures.  The patient expressed understanding, questions were answered and she  wishes to proceed with the procedure for her son.    Natalie Arnett, DO

## 2024-10-29 NOTE — TELEPHONE ENCOUNTER
----- Message from Lottie Tijerina sent at 10/29/2024 10:01 AM CDT -----  Regarding: Breast pump  This patient delivered Sunday and is going home today. She has not received a breast pump. Please send order. Thanks!

## 2024-10-29 NOTE — DISCHARGE INSTRUCTIONS
Pelvic rest for 6 weeks: nothing in the vagina (tampons, intercourse).   May shower, no bathtubs or swimming.    - Call OB for any concerns:     *Heavy bleeding that saturates one pad per hour for several hours/clots bigger than a golf ball     *Signs of preeclampsia (headache, vision changes, nausea/vomiting)     *Increased pain unrelieved by oral medications     *Anxiety or depression     *Breast concerns     *Fever 100.4 or greater     *Dizziness/fainting     *Calf pain, redness or swelling    Follow up with OB in 6 weeks.     Discharge Instructions for Vaginal Delivery  Follow-up  Schedule a  follow-up exam with the midwife who delivered you in 2 weeks and 6 weeks. Please remember to call as soon as possible for this appointment. After having a baby, your body may be very tired. It can take time to recover from a vaginal delivery. Please remember this and call if you have any questions or concerns before your 6 week appointment.   Medications    Please take Motrin at home for pain control 600mg every 6 hours as needed  Continue taking your Prenatal Vitamin daily, as long as you are nursing  Ferrous Sulfate 325 mg once per day (may obtain in form of Gentle Iron, Slow FE, or liquid Floradix)  Vitamin D3 2000 IU daily  Colace (stool softener)  once or twice per day as needed  If you have stitches  You may have received stitches in the skin near your vagina. The stitches might have closed an episiotomy (an incision that enlarges the opening of the vagina). Or you may have needed stitches to repair torn skin. Either way, your stitches should dissolve within weeks. Until then, you can help reduce discomfort, aid healing, and reduce your risk of infection by keeping the stitches clean. These tips can help:  Gently wipe from front to back after you urinate or have a bowel movement.  After wiping, spray warm water on the area. Or you can have a sitz bath. This means sitting in a tub with a few inches of water in  it. Then pat the area dry or use a hairdryer on a cool setting.  You can take a shower unless told not to.  Change sanitary pads at least every 2 to 4 hours.  Place cold packs as need, but remember to keep a thin towel between the pack and your skin.  Activity  Here are some suggestions:  Get lots of rest. Take naps as often as your can.   Increase your activities gradually.   Don’t have sexual intercourse until after you’ve had a follow-up appointment and you have decided on a birth control method.  Remember your are on pelvic rest, so nothing in your vagina (tampons etc...), no tub baths, and no swimming pools.       When to call your healthcare provider  Call your health care provider right away if you have:  A fever of 100.4°F (38.0°C) or higher  Bleeding that requires a new sanitary pad after an hour, or large blood clots. Remember your bleeding will wax and wane. Please call if you are consistently saturating a pad and hour.   Pain in your vagina that gets worse and isn't relieved with medicine.  Burning, pain, red streaks, or lumpy areas in your breasts that may be accompanied by flu-like symptoms  Nausea or vomiting  Dizziness or fainting  Feelings of extreme sadness or anxiety, or a feeling that you don’t want to be with your baby  Abdominal pain that isn’t relieved with medicine  No bowel movement for 5 days  Redness, warmth, or pain in the lower leg  Chest pain

## 2024-10-29 NOTE — PROGRESS NOTES
[facility]  part of [facility]    OB/GYNE Progress Note      Marek Odonnell Patient Status:  Inpatient    3/25/2000 MRN [id number]   Location [facility] 3SE Attending Lottie Tijerina CNM   Hosp Day # 3 PCP No primary care provider on file.        Assessment/Plan   Marek Odonnell is a 24 year old , day 2 after a vaginal delivery  Breastfeeding without difficulty   Discharge home today  Postpartum teaching completed including danger signs and when to call the CNM      Generalized hypermobility of joints        Fibromyalgia        Group B streptococcal infection   Adequately treated      Pregnancy (HCC)   Delivered      Vaginal delivery (HCC)  Normal PPD#2      History of prior pregnancy with SGA           Discussed with: nurse     patient and partner     Plan discussed with patient who verbalizes understanding and agreement.        Subjective   Currently she denies excessive bleeding or pain. No clots. Denies SOB, chest pain, HA, dizziness. + void. + stool  Support at home: partner and family  Has car seat  Undecided on birth control    Review of Systems:  Constitutional: Denies   Genitourinary: Denies   Respiratory: Denies   Psychiatric: Denies         Objective   Vital signs in last 24 hours:  Temp:  [97.8 °F (36.6 °C)-98.3 °F (36.8 °C)] 97.8 °F (36.6 °C)  Pulse:  [57-60] 60  Resp:  [16-18] 16  BP: (125-131)/(80-83) 125/80    Input/Output:  No intake or output data in the 24 hours ending 10/29/24 1001    Weight (Last 6):  Wt Readings from Last 6 Encounters:   10/26/24 193 lb (87.5 kg)   10/21/24 193 lb 6.4 oz (87.7 kg)   10/10/24 190 lb (86.2 kg)   10/10/24 190 lb (86.2 kg)   10/02/24 189 lb (85.7 kg)   24 183 lb 6.4 oz (83.2 kg)     Body mass index is 31.15 kg/m².     Exam:  Constitutional: Comfortable and bonding well with infant   Uterus: Fundus firm, below umbilicus   Wound/Incision: Well-approximated, no swelling or edema, small lochia rubra, no clots   Respiratory:  Unlabored respirations   Extremities: No edema. No evidence of DVT on exam   Psychiatric: Calm affect, appropriate     DVT Risk Score: Low risk        Results:     Lab Results   Component Value Date    TREPONEMALAB Nonreactive 10/26/2024    ABO O 10/26/2024    RH Positive 10/26/2024    WBC 10.9 10/28/2024    HGB 11.8 (L) 10/28/2024    HCT 35.4 10/28/2024    .0 10/28/2024    CREATSERUM 0.65 03/21/2024    BUN <5 (L) 03/21/2024     03/21/2024    K 4.3 03/21/2024     03/21/2024    CO2 27.0 03/21/2024    GLU 82 03/21/2024    CA 9.8 03/21/2024    ALB 4.3 03/12/2024    ALKPHO 57 03/12/2024    BILT 2.5 (H) 03/12/2024    TP 8.5 (H) 03/12/2024    AST 14 (L) 03/12/2024    ALT 25 03/12/2024    T4F 1.2 04/18/2024    TSH 1.380 04/18/2024     03/06/2023    LIP 25 03/06/2023       Lab Results   Component Value Date    MG 1.7 03/12/2024    COLORUR Yellow 03/06/2023    CLARITY Clear 03/06/2023    SPECGRAVITY 1.025 03/21/2024    PROUR Negative 03/06/2023    GLUUR Negative 03/06/2023    KETUR Trace (A) 03/06/2023    BILUR Negative 03/06/2023    BLOODURINE Negative 03/06/2023    NITRITE Negative 03/21/2024    UROBILINOGEN <2.0 03/06/2023    LEUUR Negative 03/06/2023    UASA Negative 03/06/2023       No results found.          Lottie Tijerina CNM  10/29/2024  10:01 AM

## 2024-10-29 NOTE — PLAN OF CARE
Sat with patient to review plan of care. Discussed analgesic options and answered all questions. VSS. Breastfeeding on demand. Breastfeeding successfully. Voiding independently. Lochia is WNL. Uterus is firm.     Problem: POSTPARTUM  Goal: Long Term Goal:Experiences normal postpartum course  Description: INTERVENTIONS:  - Assess and monitor vital signs and lab values.  - Assess fundus and lochia.  - Provide ice/sitz baths for perineum discomfort.  - Monitor healing of incision/episiotomy/laceration, and assess for signs and symptoms of infection and hematoma.  - Assess bladder function and monitor for bladder distention.  - Provide/instruct/assist with pericare as needed.  - Provide VTE prophylaxis as needed.  - Monitor bowel function.  - Encourage ambulation and provide assistance as needed.  - Assess and monitor emotional status and provide social service/psych resources as needed.  - Utilize standard precautions and use personal protective equipment as indicated. Ensure aseptic care of all intravenous lines and invasive tubes/drains.  - Obtain immunization and exposure to communicable diseases history.  Outcome: Adequate for Discharge  Goal: Optimize infant feeding at the breast  Description: INTERVENTIONS:  - Initiate breast feeding within first hour after birth.   - Monitor effectiveness of current breast feeding efforts.  - Assess support systems available to mother/family.  - Identify cultural beliefs/practices regarding lactation, letdown techniques, maternal food preferences.  - Assess mother's knowledge and previous experience with breast feeding.  - Provide information as needed about early infant feeding cues (e.g., rooting, lip smacking, sucking fingers/hand) versus late cue of crying.  - Discuss/demonstrate breast feeding aids (e.g., infant sling, nursing footstool/pillows, and breast pumps).  - Encourage mother/other family members to express feelings/concerns, and actively listen.  - Educate  father/SO about benefits of breast feeding and how to manage common lactation challenges.  - Recommend avoidance of specific medications or substances incompatible with breast feeding.  - Assess and monitor for signs of nipple pain/trauma.  - Instruct and provide assistance with proper latch.  - Review techniques for milk expression (breast pumping) and storage of breast milk. Provide pumping equipment/supplies, instructions and assistance, as needed.  - Encourage rooming-in and breast feeding on demand.  - Encourage skin-to-skin contact.  - Provide LC support as needed.  - Assess for and manage engorgement.  - Provide breast feeding education handouts and information on community breast feeding support.   Outcome: Adequate for Discharge  Goal: Establishment of adequate milk supply with medication/procedure interruptions  Description: INTERVENTIONS:  - Review techniques for milk expression (breast pumping).   - Provide pumping equipment/supplies, instructions, and assistance until it is safe to breastfeed infant.  Outcome: Adequate for Discharge  Goal: Appropriate maternal -  bonding  Description: INTERVENTIONS:  - Assess caregiver- interactions.  - Assess caregiver's emotional status and coping mechanisms.  - Encourage caregiver to participate in  daily care.  - Assess support systems available to mother/family.  - Provide /case management support as needed.  Outcome: Adequate for Discharge

## 2024-10-29 NOTE — DISCHARGE SUMMARY
[facility]  part of [facility]    Discharge Summary    Marek Odonnell Patient Status:  Inpatient    3/25/2000 MRN [id number]   Location [facility] 3SE Attending Lottie Tijerina CNM   Hosp Day # 3       Delivering OB Clinician: Lottie Tijerina CNM    EDC: Estimated Date of Delivery: 10/28/24    Gestational Age: 39w6d    Antepartum complications:   Patient Active Problem List   Diagnosis    Nausea and vomiting in pregnancy (HCC)    Generalized hypermobility of joints    Fibromyalgia    Gastroesophageal reflux disease without esophagitis    Tetanus, diphtheria, and acellular pertussis (Tdap) vaccination declined    Group B streptococcal infection    Pregnancy ([facility])    Vaginal delivery ([facility])    History of prior pregnancy with SGA        Date of Delivery: 10/27/2024 Time of Delivery: 7:25 AM    Delivery Type: spontaneous vaginal delivery    Baby: Liveborn male   Information for the patient's :  Sandor Odonnell [[id number]]   5 lb 14.2 oz (2.67 kg)  Apgars:  1 minute: 8  5 minutes: 910 minutes:       Intrapartum Complications: Baby SGA    Admit Date: 10/26/2024    Discharge Date: 10/29/2024    Hospital Course: No complications. Routine delivery and postpartum care    Discharged Condition: stable    Disposition: home    Plan:     Follow-up appointment in 2 weeks with TOMMY Suggs CNM  10/29/2024  10:04 AM

## 2024-10-31 ENCOUNTER — NST DOCUMENTATION (OUTPATIENT)
Dept: OBGYN CLINIC | Facility: CLINIC | Age: 24
End: 2024-10-31

## 2024-10-31 DIAGNOSIS — O21.0 HYPEREMESIS GRAVIDARUM (HCC): ICD-10-CM

## 2024-10-31 DIAGNOSIS — M79.7 FIBROMYALGIA: Primary | ICD-10-CM

## 2024-10-31 DIAGNOSIS — Z34.03: ICD-10-CM

## 2024-10-31 NOTE — NST
Nonstress Test   Patient: Marek Odonnell    Gestation: 39w6d    Diagnosis from order:   hyperemesis, fibromyalgia, pregnancy    Problem List:   Patient Active Problem List   Diagnosis    Nausea and vomiting in pregnancy (HCC)    Generalized hypermobility of joints    Fibromyalgia    Gastroesophageal reflux disease without esophagitis    Tetanus, diphtheria, and acellular pertussis (Tdap) vaccination declined    Group B streptococcal infection    Pregnancy (HCC)    Vaginal delivery ([facility])    History of prior pregnancy with SGA        NST: Reactive NST on 10/17/24        10/24/2024   NST DOCUMENTATION   Variability 6-25 BPM   Accelerations Yes   Decelerations None   Baseline 145 BPM   Uterine Irritability No   Contractions Irregular   Acoustic Stimulator No   Nonstress Test Interpretation Reactive   Nonstress Test Second Interpretation Reactive   NST Completed by JAYA Mathew   Disposition Home    Testing Plan Weekly NST   Provider Notified TOMMY Veras            I agree with the above evaluation. NST completed.  Kinga Veras CNM  10/31/2024  1:25 PM

## 2024-11-11 ENCOUNTER — TELEMEDICINE (OUTPATIENT)
Dept: OBGYN CLINIC | Facility: CLINIC | Age: 24
End: 2024-11-11

## 2024-11-11 PROBLEM — A49.1 GROUP B STREPTOCOCCAL INFECTION: Status: RESOLVED | Noted: 2024-10-04 | Resolved: 2024-11-11

## 2024-11-11 PROBLEM — O21.9 NAUSEA AND VOMITING IN PREGNANCY (HCC): Status: RESOLVED | Noted: 2024-03-21 | Resolved: 2024-11-11

## 2024-11-11 PROBLEM — Z34.90 PREGNANCY (HCC): Status: RESOLVED | Noted: 2024-10-26 | Resolved: 2024-11-11

## 2024-11-12 NOTE — PROGRESS NOTES
This visit is conducted using Telemedicine with live, interactive video and audio.    Patient has been referred to [facility] website at [email or web address] to review the yearly Consent to Treat document.    Patient understands and accepts financial responsibility for any deductible, co-insurance, and/or co-pays associated with this service.     Subjective: Marek is 2 weeks postpartum after a vaginal delivery of a baby boy.  See L&D delivery summary for birth statistics.  She is without concerns today. Bleeding is decreasing.    Reports back pain that is excruciating. She has a history of chronic pain. Reports that ibuprofen dulls the pain but does not relieve. Feels pain has gotten worse over time.     Breastfeeding successfully and reports infant is experiencing adequate weight gain.    She denies any signs/symptoms of postpartum depression and has adequate family support.  Denies any abuse.    Review of Systems   All other systems reviewed and are negative.      Objective:   There were no vitals filed for this visit.  Wt Readings from Last 6 Encounters:   10/26/24 193 lb (87.5 kg)   10/21/24 193 lb 6.4 oz (87.7 kg)   10/10/24 190 lb (86.2 kg)   10/10/24 190 lb (86.2 kg)   10/02/24 189 lb (85.7 kg)   09/18/24 183 lb 6.4 oz (83.2 kg)       Physical Exam  Constitutional:       General: She is not in acute distress.     Appearance: Normal appearance. She is not ill-appearing, toxic-appearing or diaphoretic.   Pulmonary:      Effort: Pulmonary effort is normal.   Neurological:      Mental Status: She is alert and oriented to person, place, and time.   Psychiatric:         Mood and Affect: Mood normal.         Behavior: Behavior normal.         Thought Content: Thought content normal.         Judgment: Judgment normal.          Assessment/Plan:   2 weeks postpartum, stable. Breast feeding without difficulty    Recommend reaching out to rheumatologist to see if they have recommendations regarding pain. She has tried  chiro, PT in the past with no relief. May use heat packs. Recommend using a lot of pillows for support when breastfeeding. Discussed referral to physiatrist but patient declines at this time.    Return to clinic: 6 week postpartum visit    Counseling included:   Signs/symptoms of postpartum depression  Postpartum danger signs  Lactation support and troubleshooting  Maternal and family adaption  Sleep/rest strategies  Infant safety and care  Parenting concerns    Marek verbalized understanding of plan of care. All questions answered. No barriers to learning identified.

## 2024-11-21 ENCOUNTER — TELEPHONE (OUTPATIENT)
Dept: OBGYN UNIT | Facility: HOSPITAL | Age: 24
End: 2024-11-21

## 2024-12-09 ENCOUNTER — POSTPARTUM (OUTPATIENT)
Dept: OBGYN CLINIC | Facility: CLINIC | Age: 24
End: 2024-12-09

## 2024-12-09 VITALS
BODY MASS INDEX: 32 KG/M2 | WEIGHT: 199.19 LBS | DIASTOLIC BLOOD PRESSURE: 77 MMHG | HEART RATE: 73 BPM | SYSTOLIC BLOOD PRESSURE: 117 MMHG

## 2024-12-09 DIAGNOSIS — R23.3 EASY BRUISING: ICD-10-CM

## 2024-12-09 PROCEDURE — 3074F SYST BP LT 130 MM HG: CPT | Performed by: ADVANCED PRACTICE MIDWIFE

## 2024-12-09 PROCEDURE — 3078F DIAST BP <80 MM HG: CPT | Performed by: ADVANCED PRACTICE MIDWIFE

## 2024-12-10 NOTE — PROGRESS NOTES
Chief Complaint   Patient presents with    Postpartum Care     Pt is here for 6wks pp care   Pt is no longer bleeding from birth   Pt periods have not yet returned  Pt is not interested in BC today    LILIAN-7 score = 0               HPI:   Marek is 24 year old , here today for 6-week postpartum visit.  Type and date of Delivery: 10/27/24, vaginal, Complications: uncomplicated  See Delivery Summary for baby's gender and weight  Perineum: 1st degree. Bleeding stopped. Sometimes random cramping.   Feeding Method: Breastfeeding going well. Baby gaining well.  Bonding: well bonded  Maternal sleep: 3-4 hours/night. Napping: yes  Sexual activity resumed: no. Contraceptive Method: condoms  Postpartum Depression screen: EPDS=0; GAD7=0  Moods   She reports bruising easily    Pt offered for MA to be present during exam and patient declines      HISTORY:  Patient Active Problem List   Diagnosis    Generalized hypermobility of joints    Fibromyalgia    Gastroesophageal reflux disease without esophagitis    Tetanus, diphtheria, and acellular pertussis (Tdap) vaccination declined    History of prior pregnancy with SGA        Medications (Active prior to today's visit):  Current Outpatient Medications   Medication Sig Dispense Refill    Misc. Devices (BREAST PUMP) Does not apply Misc Double electric breast pump 1 each 0    prenatal vitamin with DHA 27-0.8-228 MG Oral Cap Take 1 capsule by mouth daily.      ibuprofen 600 MG Oral Tab Take 1 tablet (600 mg total) by mouth every 6 (six) hours. (Patient not taking: Reported on 2024) 40 tablet 0       Allergies:   Allergies[1]    PHYSICAL EXAM:   Vitals:    24 1807   BP: 117/77   Pulse: 73       Pre-pregnancy 29.87  Today's Body mass index is 32.15 kg/m².    Pre-pregnancy weight 185 lbs  Today's weight   Wt Readings from Last 6 Encounters:   24 199 lb 3.2 oz (90.4 kg)   10/26/24 193 lb (87.5 kg)   10/21/24 193 lb 6.4 oz (87.7 kg)   10/10/24 190 lb (86.2  kg)   10/10/24 190 lb (86.2 kg)   10/02/24 189 lb (85.7 kg)    lbs    Physical Exam  Vitals and nursing note reviewed.   Constitutional:       General: She is not in acute distress.     Appearance: Normal appearance. She is not ill-appearing, toxic-appearing or diaphoretic.   Pulmonary:      Effort: Pulmonary effort is normal.   Abdominal:      Comments: 1 finger width separation of abdominal muscles   Genitourinary:     General: Normal vulva.      Labia:         Right: No rash, tenderness, lesion or injury.         Left: No rash, tenderness, lesion or injury.       Comments: Laceration well healed  Able to palpate engagement of pelvic floor muscles with kegel but some weakness noted  Neurological:      Mental Status: She is alert and oriented to person, place, and time.   Psychiatric:         Mood and Affect: Mood normal.         Behavior: Behavior normal.         Thought Content: Thought content normal.         Judgment: Judgment normal.            No results found for this or any previous visit (from the past 24 hours).       ASSESSMENT/PLAN:   Marek was seen today for postpartum care.    Diagnoses and all orders for this visit:    Postpartum care and examination (HCC)    Easy bruising  -     CBC, Platelet; No Differential; Future             Next annual due: 3-6 months      Counseling:   Postpartum teaching including maternal and family adaptation, sleep/rest strategies, lactation and weaning (if applicable), reaching ideal body weight, sexuality/contraception, importance of Kegel's and abdominal exercises, continuation of prenatal vitamins, and signs/symptoms of postpartum depression  Patient verbalized understanding, All questions answered. No barriers to learning identified          [1]   Allergies  Allergen Reactions    Prednisone HIVES    Bupropion ITCHING    Latex RASH

## 2025-05-05 ENCOUNTER — NURSE TRIAGE (OUTPATIENT)
Dept: TELEHEALTH | Age: 25
End: 2025-05-05

## 2025-05-08 ENCOUNTER — OFFICE VISIT (OUTPATIENT)
Dept: FAMILY MEDICINE | Age: 25
End: 2025-05-08

## 2025-05-08 VITALS
BODY MASS INDEX: 33.88 KG/M2 | HEART RATE: 80 BPM | OXYGEN SATURATION: 100 % | WEIGHT: 215.83 LBS | TEMPERATURE: 98.7 F | DIASTOLIC BLOOD PRESSURE: 80 MMHG | HEIGHT: 67 IN | RESPIRATION RATE: 18 BRPM | SYSTOLIC BLOOD PRESSURE: 117 MMHG

## 2025-05-08 DIAGNOSIS — E55.9 VITAMIN D DEFICIENCY: ICD-10-CM

## 2025-05-08 DIAGNOSIS — M79.18 CHRONIC MUSCULOSKELETAL PAIN: ICD-10-CM

## 2025-05-08 DIAGNOSIS — M79.7 FIBROMYALGIA: ICD-10-CM

## 2025-05-08 DIAGNOSIS — M24.80 GENERALIZED HYPERMOBILITY OF JOINTS: ICD-10-CM

## 2025-05-08 DIAGNOSIS — E66.09 CLASS 1 OBESITY DUE TO EXCESS CALORIES WITHOUT SERIOUS COMORBIDITY WITH BODY MASS INDEX (BMI) OF 34.0 TO 34.9 IN ADULT: ICD-10-CM

## 2025-05-08 DIAGNOSIS — E66.811 CLASS 1 OBESITY DUE TO EXCESS CALORIES WITHOUT SERIOUS COMORBIDITY WITH BODY MASS INDEX (BMI) OF 34.0 TO 34.9 IN ADULT: ICD-10-CM

## 2025-05-08 DIAGNOSIS — D64.9 ANEMIA, UNSPECIFIED TYPE: ICD-10-CM

## 2025-05-08 DIAGNOSIS — G89.29 CHRONIC MUSCULOSKELETAL PAIN: ICD-10-CM

## 2025-05-08 DIAGNOSIS — R03.0 ELEVATED BLOOD PRESSURE READING WITHOUT DIAGNOSIS OF HYPERTENSION: Primary | ICD-10-CM

## 2025-05-08 PROBLEM — K21.9 GASTROESOPHAGEAL REFLUX DISEASE WITHOUT ESOPHAGITIS: Status: ACTIVE | Noted: 2023-02-15

## 2025-05-08 PROBLEM — N93.0 BLEEDING AFTER INTERCOURSE: Status: RESOLVED | Noted: 2022-07-03 | Resolved: 2025-05-08

## 2025-05-08 PROBLEM — K64.4 EXTERNAL HEMORRHOIDS WITHOUT COMPLICATION: Status: RESOLVED | Noted: 2023-04-14 | Resolved: 2025-05-08

## 2025-05-08 PROBLEM — Z87.59 HISTORY OF PRIOR PREGNANCY WITH SGA NEWBORN: Status: ACTIVE | Noted: 2024-10-27

## 2025-05-08 ASSESSMENT — PATIENT HEALTH QUESTIONNAIRE - PHQ9
CLINICAL INTERPRETATION OF PHQ2 SCORE: NO FURTHER SCREENING NEEDED
CLINICAL INTERPRETATION OF PHQ2 SCORE: NO FURTHER SCREENING NEEDED
1. LITTLE INTEREST OR PLEASURE IN DOING THINGS: NOT AT ALL
SUM OF ALL RESPONSES TO PHQ9 QUESTIONS 1 AND 2: 0
SUM OF ALL RESPONSES TO PHQ9 QUESTIONS 1 AND 2: 0
2. FEELING DOWN, DEPRESSED OR HOPELESS: NOT AT ALL
SUM OF ALL RESPONSES TO PHQ9 QUESTIONS 1 AND 2: 0
1. LITTLE INTEREST OR PLEASURE IN DOING THINGS: NOT AT ALL
SUM OF ALL RESPONSES TO PHQ9 QUESTIONS 1 AND 2: 0
2. FEELING DOWN, DEPRESSED OR HOPELESS: NOT AT ALL

## 2025-05-08 ASSESSMENT — PAIN SCALES - GENERAL: PAINLEVEL_OUTOF10: 7

## 2025-05-09 ENCOUNTER — LAB SERVICES (OUTPATIENT)
Dept: LAB | Age: 25
End: 2025-05-09

## 2025-05-09 DIAGNOSIS — E66.09 CLASS 1 OBESITY DUE TO EXCESS CALORIES WITHOUT SERIOUS COMORBIDITY WITH BODY MASS INDEX (BMI) OF 34.0 TO 34.9 IN ADULT: ICD-10-CM

## 2025-05-09 DIAGNOSIS — M79.18 CHRONIC MUSCULOSKELETAL PAIN: ICD-10-CM

## 2025-05-09 DIAGNOSIS — E66.811 CLASS 1 OBESITY DUE TO EXCESS CALORIES WITHOUT SERIOUS COMORBIDITY WITH BODY MASS INDEX (BMI) OF 34.0 TO 34.9 IN ADULT: ICD-10-CM

## 2025-05-09 DIAGNOSIS — E55.9 VITAMIN D DEFICIENCY: ICD-10-CM

## 2025-05-09 DIAGNOSIS — M79.7 FIBROMYALGIA: ICD-10-CM

## 2025-05-09 DIAGNOSIS — G89.29 CHRONIC MUSCULOSKELETAL PAIN: ICD-10-CM

## 2025-05-09 DIAGNOSIS — R03.0 ELEVATED BLOOD PRESSURE READING WITHOUT DIAGNOSIS OF HYPERTENSION: ICD-10-CM

## 2025-05-09 DIAGNOSIS — D64.9 ANEMIA, UNSPECIFIED TYPE: ICD-10-CM

## 2025-05-09 PROCEDURE — 86038 ANTINUCLEAR ANTIBODIES: CPT | Performed by: CLINICAL MEDICAL LABORATORY

## 2025-05-09 PROCEDURE — 83036 HEMOGLOBIN GLYCOSYLATED A1C: CPT | Performed by: CLINICAL MEDICAL LABORATORY

## 2025-05-09 PROCEDURE — 36415 COLL VENOUS BLD VENIPUNCTURE: CPT

## 2025-05-09 PROCEDURE — 80061 LIPID PANEL: CPT | Performed by: CLINICAL MEDICAL LABORATORY

## 2025-05-09 PROCEDURE — 84439 ASSAY OF FREE THYROXINE: CPT | Performed by: CLINICAL MEDICAL LABORATORY

## 2025-05-09 PROCEDURE — 82306 VITAMIN D 25 HYDROXY: CPT | Performed by: CLINICAL MEDICAL LABORATORY

## 2025-05-09 PROCEDURE — 82550 ASSAY OF CK (CPK): CPT | Performed by: CLINICAL MEDICAL LABORATORY

## 2025-05-09 PROCEDURE — 80050 GENERAL HEALTH PANEL: CPT | Performed by: CLINICAL MEDICAL LABORATORY

## 2025-05-10 LAB
25(OH)D3+25(OH)D2 SERPL-MCNC: 31.8 NG/ML (ref 30–100)
ALBUMIN SERPL-MCNC: 4.3 G/DL (ref 3.4–5)
ALBUMIN/GLOB SERPL: 1.2 {RATIO} (ref 1–2.4)
ALP SERPL-CCNC: 97 UNITS/L (ref 45–117)
ALT SERPL-CCNC: 22 UNITS/L
ANION GAP SERPL CALC-SCNC: 10 MMOL/L (ref 7–19)
AST SERPL-CCNC: 15 UNITS/L
BASOPHILS # BLD: 0 K/MCL (ref 0–0.3)
BASOPHILS NFR BLD: 0 %
BILIRUB SERPL-MCNC: 1.6 MG/DL (ref 0.2–1)
BUN SERPL-MCNC: 12 MG/DL (ref 6–20)
BUN/CREAT SERPL: 16 (ref 7–25)
CALCIUM SERPL-MCNC: 9.5 MG/DL (ref 8.4–10.2)
CHLORIDE SERPL-SCNC: 105 MMOL/L (ref 97–110)
CHOLEST SERPL-MCNC: 143 MG/DL
CHOLEST/HDLC SERPL: 2.6 {RATIO}
CK SERPL-CCNC: 169 UNITS/L (ref 26–192)
CO2 SERPL-SCNC: 27 MMOL/L (ref 21–32)
CREAT SERPL-MCNC: 0.75 MG/DL (ref 0.51–0.95)
DEPRECATED RDW RBC: 42.5 FL (ref 39–50)
EGFRCR SERPLBLD CKD-EPI 2021: >90 ML/MIN/{1.73_M2}
EOSINOPHIL # BLD: 0.4 K/MCL (ref 0–0.5)
EOSINOPHIL NFR BLD: 5 %
ERYTHROCYTE [DISTWIDTH] IN BLOOD: 12.9 % (ref 11–15)
FASTING DURATION TIME PATIENT: ABNORMAL H
GLOBULIN SER-MCNC: 3.6 G/DL (ref 2–4)
GLUCOSE SERPL-MCNC: 75 MG/DL (ref 70–99)
HBA1C MFR BLD: 5.9 % (ref 4.5–5.6)
HCT VFR BLD CALC: 43.6 % (ref 36–46.5)
HDLC SERPL-MCNC: 54 MG/DL
HGB BLD-MCNC: 14.3 G/DL (ref 12–15.5)
IMM GRANULOCYTES # BLD AUTO: 0 K/MCL (ref 0–0.2)
IMM GRANULOCYTES # BLD: 0 %
LDLC SERPL CALC-MCNC: 80 MG/DL
LYMPHOCYTES # BLD: 2.2 K/MCL (ref 1–4.8)
LYMPHOCYTES NFR BLD: 29 %
MCH RBC QN AUTO: 29.3 PG (ref 26–34)
MCHC RBC AUTO-ENTMCNC: 32.8 G/DL (ref 32–36.5)
MCV RBC AUTO: 89.3 FL (ref 78–100)
MONOCYTES # BLD: 0.8 K/MCL (ref 0.3–0.9)
MONOCYTES NFR BLD: 11 %
NEUTROPHILS # BLD: 4.2 K/MCL (ref 1.8–7.7)
NEUTROPHILS NFR BLD: 55 %
NONHDLC SERPL-MCNC: 89 MG/DL
NRBC BLD MANUAL-RTO: 0 /100 WBC
PLATELET # BLD AUTO: 404 K/MCL (ref 140–450)
POTASSIUM SERPL-SCNC: 3.9 MMOL/L (ref 3.4–5.1)
PROT SERPL-MCNC: 7.9 G/DL (ref 6.4–8.2)
RBC # BLD: 4.88 MIL/MCL (ref 4–5.2)
SODIUM SERPL-SCNC: 138 MMOL/L (ref 135–145)
T4 FREE SERPL-MCNC: 1.1 NG/DL (ref 0.8–1.5)
TRIGL SERPL-MCNC: 47 MG/DL
TSH SERPL-ACNC: 0.73 MCUNITS/ML (ref 0.35–5)
WBC # BLD: 7.6 K/MCL (ref 4.2–11)

## 2025-05-12 ENCOUNTER — RESULTS FOLLOW-UP (OUTPATIENT)
Dept: FAMILY MEDICINE | Age: 25
End: 2025-05-12

## 2025-05-12 LAB
DSDNA IGG SERPL IA-ACNC: 3.9 IU/ML
NUCLEAR IGG SER IA-RTO: 0.6 RATIO
U1 SNRNP IGG SER IA-ACNC: 1.5 U/ML

## (undated) NOTE — LETTER
VACCINE ADMINISTRATION RECORD  PARENT / GUARDIAN APPROVAL  Date: 2024  Vaccine administered to: Marek Odonnell     : 3/25/2000    MRN: [id number]    A copy of the appropriate Centers for Disease Control and Prevention Vaccine Information statement has been provided. I have read or have had explained the information about the diseases and the vaccines listed below. There was an opportunity to ask questions and any questions were answered satisfactorily. I believe that I understand the benefits and risks of the vaccine cited and ask that the vaccine(s) listed below be given to me or to the person named above (for whom I am authorized to make this request).    VACCINES ADMINISTERED:  DTaP ***    I have read and hereby agree to be bound by the terms of this agreement as stated above. My signature is valid until revoked by me in writing.  This document is signed by Marek Blas , relationship: Self on 2024.:                                                                                                                                         Parent / Guardian Signature                                                Date    Asya GARCIA MA served as a witness to authentication that the identity of the person signing electronically is in fact the person represented as signing.    This document was generated by Asya GARCIA MA on 2024.

## (undated) NOTE — LETTER
[facility]  Administration of Anesthesia  Marek SMITH agree to be cared for by a physician anesthesiologist alone and/or with a nurse anesthetist, who is specially trained to monitor me and give me medicine to put me to sleep or keep me comfortable during my procedure    I understand that my anesthesiologist and/or anesthetist is not an employee or agent of [facility] or [employer]. He or she works for [facility]    As the patient asking for anesthesia services, I agree to:  Allow the anesthesiologist (anesthesia doctor) to give me medicine and do additional procedures as necessary. Some examples are: Starting or using an “IV” to give me medicine, fluids or blood during my procedure, and having a breathing tube placed to help me breathe when I’m asleep (intubation). In the event that my heart stops working properly, I understand that my anesthesiologist will make every effort to sustain my life, unless otherwise directed by [facility] Do Not Resuscitate documents.  Tell my anesthesia doctor before my procedure:  If I am pregnant.  The last time that I ate or drank.  iii. All of the medicines I take (including prescriptions, herbal supplements, and pills I can buy without a prescription (including street drugs/illegal medications). Failure to inform my anesthesiologist about these medicines may increase my risk of anesthetic complications.  iv.If I am allergic to anything or have had a reaction to anesthesia before.  I understand how the anesthesia medicine will help me (benefits).  I understand that with any type of anesthesia medicine there are risks:  The most common risks are: nausea, vomiting, sore throat, muscle soreness, damage to my eyes, mouth, or teeth (from breathing tube placement).  Rare risks include: remembering what happened during my procedure, allergic reactions to medications, injury to my airway, heart, lungs, vision, nerves, or muscles  and in extremely rare instances death.  My doctor has explained to me other choices available to me for my care (alternatives).  Pregnant Patients (“epidural”):  I understand that the risks of having an epidural (medicine given into my back to help control pain during labor), include itching, low blood pressure, difficulty urinating, headache or slowing of the baby’s heart. Very rare risks include infection, bleeding, seizure, irregular heart rhythms and nerve injury.  Regional Anesthesia (“spinal”, “epidural”, & “nerve blocks”):  I understand that rare but potential complications include headache, bleeding, infection, seizure, irregular heart rhythms, and nerve injury.    _____________________________________________________________________________  Patient (or Representative) Signature/Relationship to Patient  Date   Time    _____________________________________________________________________________   Name (if used)    Language/Organization   Time    _____________________________________________________________________________  Nurse Anesthetist Signature     Date   Time  _____________________________________________________________________________  Anesthesiologist Signature     Date   Time  I have discussed the procedure and information above with the patient (or patient’s representative) and answered their questions. The patient or their representative has agreed to have anesthesia services.    _____________________________________________________________________________  Witness        Date   Time  I have verified that the signature is that of the patient or patient’s representative, and that it was signed before the procedure  Patient Name: Marek Odonnell     : 3/25/2000                 Printed: 10/26/2024 at 9:17 PM    Medical Record #: [id number]                                            Page 1 of 1  ----------ANESTHESIA CONSENT----------

## (undated) NOTE — LETTER
10/27/2024    Name:  Marek Odonnell  MRN:  [id number]   Address:  [address] [place] [id number] :  3/25/2000       Rx: PHYTONADIONE 1 MG/0.5ML IJ SOLN PRESERVATIVE FREE  First Fill Date: 10/27/2024  Start Date: TBD  End Date:  TBD  Qty Disp:  **0.5ML** (ONE HALF MILLILITERS)  Sig:  The dose is to be administered to the infant after birth.  Refills: **0** (ZERO)   CHENTE:  YES - PRESERVATIVE FREE      Comments:  Mother delivering at [facility], please  to [place] in-patient pharmacy. Estimated Date of Delivery: 10/28/24      Signature:       This is an original prescription. This is an original prescription. This is an original prescription. This is an original prescription. This is an or*This is an original prescription. This is an original prescription. This is an original prescription. This is an original prescription. This is an or*This is an original prescription. This is an original prescription. This is an original prescription. This is an original prescription. This is an or*This is an original prescription. This is an original prescription. This is an original prescription. This is an original prescription. This is an or*   Electronically signed by No name on file @ 2:57 PM on 10/27/2024        Security features: (*) bordered and spelled quantities, microprint signature line visible at 5X or > magnification that must show \"THIS IS AN ORIGINAL PRESCRIPTION\" & this description of features.